# Patient Record
Sex: FEMALE | Race: WHITE | Employment: FULL TIME | URBAN - METROPOLITAN AREA
[De-identification: names, ages, dates, MRNs, and addresses within clinical notes are randomized per-mention and may not be internally consistent; named-entity substitution may affect disease eponyms.]

---

## 2017-02-02 NOTE — PROGRESS NOTES
HPI:   Humble Ocampo is a 46year old female who c/o needing refill on wellbutrin. Would like to increase her dose. Had headache the first two days of taking but no other adverse effects.  Was previously on wellbutrin SR twice daily but prefers the onc total) by mouth daily. Dispense: 30 tablet; Refill: 2    We discussed the nature of depression/anxiety. Crystal Coleman appears to be safe to Her self and others. I recommended treatment with bupropion and Crystal Coleman is in agreement.   I informed Her of the risks, benef

## 2017-04-05 ENCOUNTER — HOSPITAL ENCOUNTER (OUTPATIENT)
Dept: MAMMOGRAPHY | Age: 42
Discharge: HOME | End: 2017-04-05
Attending: PHYSICIAN ASSISTANT
Payer: COMMERCIAL

## 2017-04-05 DIAGNOSIS — Z12.31 VISIT FOR SCREENING MAMMOGRAM: ICD-10-CM

## 2017-04-06 ENCOUNTER — PATIENT MESSAGE (OUTPATIENT)
Dept: FAMILY MEDICINE CLINIC | Facility: CLINIC | Age: 42
End: 2017-04-06

## 2017-04-06 ENCOUNTER — LAB ENCOUNTER (OUTPATIENT)
Dept: LAB | Facility: HOSPITAL | Age: 42
End: 2017-04-06
Attending: PHYSICIAN ASSISTANT
Payer: COMMERCIAL

## 2017-04-06 DIAGNOSIS — Z00.00 LABORATORY EXAM ORDERED AS PART OF ROUTINE GENERAL MEDICAL EXAMINATION: ICD-10-CM

## 2017-04-06 DIAGNOSIS — F32.9 REACTIVE DEPRESSION: ICD-10-CM

## 2017-04-06 DIAGNOSIS — N64.3 GALACTORRHEA: Primary | ICD-10-CM

## 2017-04-06 DIAGNOSIS — N64.3 INAPPROPRIATE PRODUCTION OF MILK: Primary | ICD-10-CM

## 2017-04-06 DIAGNOSIS — N64.3 GALACTORRHEA: ICD-10-CM

## 2017-04-06 PROCEDURE — 36415 COLL VENOUS BLD VENIPUNCTURE: CPT

## 2017-04-06 PROCEDURE — 80061 LIPID PANEL: CPT

## 2017-04-06 PROCEDURE — 82607 VITAMIN B-12: CPT

## 2017-04-06 PROCEDURE — 82306 VITAMIN D 25 HYDROXY: CPT

## 2017-04-06 PROCEDURE — 84146 ASSAY OF PROLACTIN: CPT

## 2017-04-06 PROCEDURE — 80053 COMPREHEN METABOLIC PANEL: CPT

## 2017-04-06 PROCEDURE — 85025 COMPLETE CBC W/AUTO DIFF WBC: CPT

## 2017-04-06 PROCEDURE — 84702 CHORIONIC GONADOTROPIN TEST: CPT

## 2017-04-06 PROCEDURE — 84443 ASSAY THYROID STIM HORMONE: CPT

## 2017-04-06 NOTE — TELEPHONE ENCOUNTER
[employer] please see message from pt below. Do you want her to follow up with appt?

## 2017-04-06 NOTE — TELEPHONE ENCOUNTER
From: Cristhian Mcbride  To: TAHIR Pope  Sent: 4/6/2017 8:27 AM CDT  Subject: Other    Good morning,    I went in for my mammogram yesterday (ordered routinely because I am turning 42), and I was leaking milk.  They canceled the mammogram and aske

## 2017-04-06 NOTE — TELEPHONE ENCOUNTER
From: TAHIR Albarado  To: Deysi óLpez  Sent: 0/6/9803 10:07 AM CDT  Subject: Follow up    Irlanda Dean,     I would also suggest making an appointment with endocrinology for further evaluation. I recommend Dr Larry Tim or colleague at [phone].      An

## 2017-04-07 ENCOUNTER — TELEPHONE (OUTPATIENT)
Dept: FAMILY MEDICINE CLINIC | Facility: CLINIC | Age: 42
End: 2017-04-07

## 2017-04-10 ENCOUNTER — PATIENT MESSAGE (OUTPATIENT)
Dept: FAMILY MEDICINE CLINIC | Facility: CLINIC | Age: 42
End: 2017-04-10

## 2017-04-10 DIAGNOSIS — Z12.39 SCREENING FOR BREAST CANCER: ICD-10-CM

## 2017-04-10 DIAGNOSIS — N64.3 GALACTORRHEA: Primary | ICD-10-CM

## 2017-04-10 DIAGNOSIS — F32.9 REACTIVE DEPRESSION: ICD-10-CM

## 2017-04-11 DIAGNOSIS — D72.819 LEUKOPENIA, UNSPECIFIED TYPE: ICD-10-CM

## 2017-04-11 DIAGNOSIS — N64.3 GALACTORRHEA IN FEMALE: Primary | ICD-10-CM

## 2017-04-11 RX ORDER — BUPROPION HYDROCHLORIDE 300 MG/1
300 TABLET ORAL DAILY
Qty: 30 TABLET | Refills: 2 | OUTPATIENT
Start: 2017-04-11

## 2017-04-11 NOTE — TELEPHONE ENCOUNTER
From: Yovany Milton  To: TAHIR Tee  Sent: 4/10/2017 1:56 PM CDT  Subject: Medication Renewal Request    Original authorizing provider: TAHIR Ricks would like a refill of the following medications:  BuPROPion HCl

## 2017-04-12 NOTE — PROGRESS NOTES
HPI:   Angela Ar is a 46year old female who c/o follow up anxiety. Is tolerating wellbutrin well without side effects. Current stressors include:     Mother on hospice, going through divorce, death of two close friends in the past year     Sym with concentration  PSYCH: denies episodes of panic,denies anhedonia, +trouble sleeping    EXAM:   GENERAL: well developed and in no apparent distress  SKIN: warm & dry  HEENT: atraumatic, normocephalic  NECK: supple, no adenopathy, no thyromegaly  LUNGS:

## 2018-02-01 ENCOUNTER — OFFICE VISIT (OUTPATIENT)
Dept: FAMILY MEDICINE CLINIC | Facility: CLINIC | Age: 43
End: 2018-02-01

## 2018-02-01 VITALS
BODY MASS INDEX: 20.61 KG/M2 | OXYGEN SATURATION: 98 % | WEIGHT: 136 LBS | HEIGHT: 68 IN | HEART RATE: 70 BPM | SYSTOLIC BLOOD PRESSURE: 124 MMHG | DIASTOLIC BLOOD PRESSURE: 78 MMHG | TEMPERATURE: 98 F | RESPIRATION RATE: 20 BRPM

## 2018-02-01 DIAGNOSIS — Z00.00 GENERAL MEDICAL EXAM: ICD-10-CM

## 2018-02-01 DIAGNOSIS — G47.9 SLEEP DISTURBANCE: Primary | ICD-10-CM

## 2018-02-01 DIAGNOSIS — R25.9 ABNORMAL MOVEMENTS: ICD-10-CM

## 2018-02-01 DIAGNOSIS — Z12.31 ENCOUNTER FOR SCREENING MAMMOGRAM FOR HIGH-RISK PATIENT: ICD-10-CM

## 2018-02-01 PROCEDURE — 99214 OFFICE O/P EST MOD 30 MIN: CPT | Performed by: FAMILY MEDICINE

## 2018-02-01 RX ORDER — PRAZOSIN HYDROCHLORIDE 1 MG/1
1 CAPSULE ORAL NIGHTLY
Qty: 90 CAPSULE | Refills: 0 | Status: SHIPPED | OUTPATIENT
Start: 2018-02-01 | End: 2018-03-06

## 2018-02-01 NOTE — PROGRESS NOTES
HPI:   Don Smith is a 38year old female with sleep disturbance    Pt reports she has had cyclic sleep issues  Pt reports it seems to go in cycles of 5 years    Pt remembers most of the time   \" funny electrical feeling in her head, then she will vaginal discharge or itching   MUSCULOSKELETAL: denies back pain  PSYCHE: denies depression or anxiety  HEMATOLOGIC: denies hx of anemia  ENDOCRINE: + thyroid history  ALL/ASTHMA: denies  asthma    EXAM:   /78   Pulse 70   Temp 98.2 °F (36.8 °C) (Ora

## 2018-02-03 ENCOUNTER — HOSPITAL ENCOUNTER (OUTPATIENT)
Dept: MAMMOGRAPHY | Age: 43
Discharge: HOME | End: 2018-02-03
Attending: FAMILY MEDICINE
Payer: COMMERCIAL

## 2018-02-03 DIAGNOSIS — Z12.31 ENCOUNTER FOR SCREENING MAMMOGRAM FOR HIGH-RISK PATIENT: ICD-10-CM

## 2018-02-03 PROCEDURE — 77063 BREAST TOMOSYNTHESIS BI: CPT | Performed by: FAMILY MEDICINE

## 2018-02-03 PROCEDURE — 77067 SCR MAMMO BI INCL CAD: CPT | Performed by: FAMILY MEDICINE

## 2018-03-06 DIAGNOSIS — G47.9 SLEEP DISTURBANCE: ICD-10-CM

## 2018-03-07 ENCOUNTER — PATIENT MESSAGE (OUTPATIENT)
Dept: FAMILY MEDICINE CLINIC | Facility: CLINIC | Age: 43
End: 2018-03-07

## 2018-03-07 DIAGNOSIS — G47.9 SLEEP DISTURBANCE: ICD-10-CM

## 2018-03-07 RX ORDER — PRAZOSIN HYDROCHLORIDE 1 MG/1
1 CAPSULE ORAL NIGHTLY
Qty: 90 CAPSULE | Refills: 0 | Status: SHIPPED | OUTPATIENT
Start: 2018-03-07 | End: 2018-05-14

## 2018-03-07 RX ORDER — PRAZOSIN HYDROCHLORIDE 2 MG/1
2 CAPSULE ORAL NIGHTLY
Qty: 30 CAPSULE | Refills: 0 | Status: SHIPPED | OUTPATIENT
Start: 2018-03-07 | End: 2018-05-14

## 2018-03-07 RX ORDER — PRAZOSIN HYDROCHLORIDE 1 MG/1
1 CAPSULE ORAL NIGHTLY
Qty: 30 CAPSULE | Refills: 0 | Status: SHIPPED | OUTPATIENT
Start: 2018-03-07 | End: 2018-05-14

## 2018-03-07 NOTE — TELEPHONE ENCOUNTER
Pt was supposed to follow up after one month to discuss medication as it is new  Pt was supposed to be seen after one month on meds   Ok for 30 days of 3mg

## 2018-03-07 NOTE — TELEPHONE ENCOUNTER
From: Yovany Milton  Sent: 7/6/9765 12:58 PM CST  Subject: Medication Renewal Request    Chiqui Ramírez would like a refill of the following medications:     Prazosin HCl 1 MG Oral Cap Dangelo Teran, DO]   Patient Comment: I have had some success w

## 2018-03-07 NOTE — TELEPHONE ENCOUNTER
From: Humble Ocampo  To: Lyric Whitley DO  Sent: 3/7/2018 10:09 AM CST  Subject: Prescription Question    I see a refill was approved, but in my request I asked for the dosage to be changed.  I run out after 10-14 days and my insurance won't cover Murdock Waldron

## 2018-03-23 ENCOUNTER — HOSPITAL ENCOUNTER (OUTPATIENT)
Dept: ULTRASOUND IMAGING | Age: 43
Discharge: HOME | End: 2018-03-23
Attending: OTOLARYNGOLOGY
Payer: COMMERCIAL

## 2018-03-23 ENCOUNTER — LAB ENCOUNTER (OUTPATIENT)
Dept: LAB | Age: 43
End: 2018-03-23
Attending: FAMILY MEDICINE
Payer: COMMERCIAL

## 2018-03-23 ENCOUNTER — PRIOR ORIGINAL RECORDS (OUTPATIENT)
Dept: OTHER | Age: 43
End: 2018-03-23

## 2018-03-23 DIAGNOSIS — Z00.00 GENERAL MEDICAL EXAM: ICD-10-CM

## 2018-03-23 DIAGNOSIS — E04.2 NONTOXIC MULTINODULAR GOITER: ICD-10-CM

## 2018-03-23 LAB
25-HYDROXYVITAMIN D (TOTAL): 36.1 NG/ML (ref 30–100)
ALBUMIN SERPL-MCNC: 3.7 G/DL (ref 3.5–4.8)
ALP LIVER SERPL-CCNC: 46 U/L (ref 37–98)
ALT SERPL-CCNC: 17 U/L (ref 14–54)
AST SERPL-CCNC: 13 U/L (ref 15–41)
BASOPHILS # BLD AUTO: 0.04 X10(3) UL (ref 0–0.1)
BASOPHILS NFR BLD AUTO: 0.7 %
BILIRUB SERPL-MCNC: 0.7 MG/DL (ref 0.1–2)
BUN BLD-MCNC: 9 MG/DL (ref 8–20)
CALCIUM BLD-MCNC: 8.3 MG/DL (ref 8.3–10.3)
CHLORIDE: 108 MMOL/L (ref 101–111)
CHOLEST SMN-MCNC: 160 MG/DL (ref ?–200)
CO2: 26 MMOL/L (ref 22–32)
CREAT BLD-MCNC: 0.76 MG/DL (ref 0.55–1.02)
EOSINOPHIL # BLD AUTO: 0.25 X10(3) UL (ref 0–0.3)
EOSINOPHIL NFR BLD AUTO: 4.7 %
ERYTHROCYTE [DISTWIDTH] IN BLOOD BY AUTOMATED COUNT: 12.5 % (ref 11.5–16)
FREE T4: 1 NG/DL (ref 0.9–1.8)
GLUCOSE BLD-MCNC: 87 MG/DL (ref 70–99)
HAV AB SERPL IA-ACNC: 624 PG/ML (ref 193–986)
HCT VFR BLD AUTO: 38.9 % (ref 34–50)
HDLC SERPL-MCNC: 60 MG/DL (ref 45–?)
HDLC SERPL: 2.67 {RATIO} (ref ?–4.44)
HGB BLD-MCNC: 12.7 G/DL (ref 12–16)
IMMATURE GRANULOCYTE COUNT: 0.02 X10(3) UL (ref 0–1)
IMMATURE GRANULOCYTE RATIO %: 0.4 %
LDLC SERPL CALC-MCNC: 86 MG/DL (ref ?–130)
LYMPHOCYTES # BLD AUTO: 1.81 X10(3) UL (ref 0.9–4)
LYMPHOCYTES NFR BLD AUTO: 33.9 %
M PROTEIN MFR SERPL ELPH: 6.7 G/DL (ref 6.1–8.3)
MCH RBC QN AUTO: 29.2 PG (ref 27–33.2)
MCHC RBC AUTO-ENTMCNC: 32.6 G/DL (ref 31–37)
MCV RBC AUTO: 89.4 FL (ref 81–100)
MONOCYTES # BLD AUTO: 0.49 X10(3) UL (ref 0.1–1)
MONOCYTES NFR BLD AUTO: 9.2 %
NEUTROPHIL ABS PRELIM: 2.73 X10 (3) UL (ref 1.3–6.7)
NEUTROPHILS # BLD AUTO: 2.73 X10(3) UL (ref 1.3–6.7)
NEUTROPHILS NFR BLD AUTO: 51.1 %
NONHDLC SERPL-MCNC: 100 MG/DL (ref ?–130)
PLATELET # BLD AUTO: 251 10(3)UL (ref 150–450)
POTASSIUM SERPL-SCNC: 4.5 MMOL/L (ref 3.6–5.1)
RBC # BLD AUTO: 4.35 X10(6)UL (ref 3.8–5.1)
RED CELL DISTRIBUTION WIDTH-SD: 41.1 FL (ref 35.1–46.3)
SODIUM SERPL-SCNC: 140 MMOL/L (ref 136–144)
TRIGL SERPL-MCNC: 69 MG/DL (ref ?–150)
TSI SER-ACNC: 1.13 MIU/ML (ref 0.35–5.5)
VLDLC SERPL CALC-MCNC: 14 MG/DL (ref 5–40)
WBC # BLD AUTO: 5.3 X10(3) UL (ref 4–13)

## 2018-03-23 PROCEDURE — 84439 ASSAY OF FREE THYROXINE: CPT

## 2018-03-23 PROCEDURE — 76536 US EXAM OF HEAD AND NECK: CPT | Performed by: OTOLARYNGOLOGY

## 2018-03-23 PROCEDURE — 85025 COMPLETE CBC W/AUTO DIFF WBC: CPT

## 2018-03-23 PROCEDURE — 82607 VITAMIN B-12: CPT

## 2018-03-23 PROCEDURE — 80061 LIPID PANEL: CPT

## 2018-03-23 PROCEDURE — 82306 VITAMIN D 25 HYDROXY: CPT

## 2018-03-23 PROCEDURE — 84443 ASSAY THYROID STIM HORMONE: CPT

## 2018-03-23 PROCEDURE — 80050 GENERAL HEALTH PANEL: CPT

## 2018-03-23 PROCEDURE — 36415 COLL VENOUS BLD VENIPUNCTURE: CPT

## 2018-05-14 ENCOUNTER — OFFICE VISIT (OUTPATIENT)
Dept: FAMILY MEDICINE CLINIC | Facility: CLINIC | Age: 43
End: 2018-05-14

## 2018-05-14 VITALS
HEART RATE: 86 BPM | OXYGEN SATURATION: 98 % | RESPIRATION RATE: 22 BRPM | WEIGHT: 140 LBS | BODY MASS INDEX: 21.22 KG/M2 | SYSTOLIC BLOOD PRESSURE: 120 MMHG | HEIGHT: 68 IN | TEMPERATURE: 99 F | DIASTOLIC BLOOD PRESSURE: 80 MMHG

## 2018-05-14 DIAGNOSIS — E06.3 HASHIMOTO'S THYROIDITIS: ICD-10-CM

## 2018-05-14 DIAGNOSIS — Z00.00 ANNUAL PHYSICAL EXAM: Primary | ICD-10-CM

## 2018-05-14 DIAGNOSIS — F51.5 NIGHTMARES: ICD-10-CM

## 2018-05-14 DIAGNOSIS — E04.2 MULTIPLE THYROID NODULES: ICD-10-CM

## 2018-05-14 DIAGNOSIS — R21 RASH: ICD-10-CM

## 2018-05-14 PROCEDURE — 99396 PREV VISIT EST AGE 40-64: CPT | Performed by: FAMILY MEDICINE

## 2018-05-14 RX ORDER — PRAZOSIN HYDROCHLORIDE 2 MG/1
2 CAPSULE ORAL NIGHTLY
Qty: 30 CAPSULE | Refills: 0 | Status: SHIPPED | OUTPATIENT
Start: 2018-05-14 | End: 2018-09-08

## 2018-05-14 RX ORDER — PRAZOSIN HYDROCHLORIDE 1 MG/1
1 CAPSULE ORAL NIGHTLY
Qty: 30 CAPSULE | Refills: 0 | Status: SHIPPED | OUTPATIENT
Start: 2018-05-14 | End: 2018-09-08

## 2018-05-14 NOTE — PROGRESS NOTES
HPI:   Humble Ocampo is a 41year old female who presents for a complete physical exam.     Wt Readings from Last 6 Encounters:  05/14/18 : 140 lb  02/01/18 : 136 lb  04/12/17 : 135 lb  02/02/17 : 137 lb  12/28/16 : 138 lb  04/04/16 : 135 lb    Body ma Alcohol use: No              Occ: . : . Children: 5. Exercise: three times per week.   Diet: doesn't watch and pt has been low gluten      REVIEW OF SYSTEMS:   GENERAL: feels well otherwise  SKIN: denies any unusual skin lesions  EYES:denies b THYROID ([id number]); Future    3. Multiple thyroid nodules  - US THYROID (GEQ=76214); Future    4. Rash  - Fluocinonide 0.05 % External Cream; Apply bid For 10-14 days  Dispense: 30 g; Refill: 1    5. Nightmares  - Prazosin HCl 2 MG Oral Cap;  Take 1 capsul

## 2018-05-23 ENCOUNTER — APPOINTMENT (OUTPATIENT)
Dept: GENERAL RADIOLOGY | Facility: HOSPITAL | Age: 43
End: 2018-05-23
Attending: EMERGENCY MEDICINE
Payer: COMMERCIAL

## 2018-05-23 ENCOUNTER — PRIOR ORIGINAL RECORDS (OUTPATIENT)
Dept: OTHER | Age: 43
End: 2018-05-23

## 2018-05-23 ENCOUNTER — HOSPITAL ENCOUNTER (OUTPATIENT)
Age: 43
Discharge: ANOTHER HOSPITAL | End: 2018-05-23
Attending: FAMILY MEDICINE
Payer: COMMERCIAL

## 2018-05-23 ENCOUNTER — HOSPITAL ENCOUNTER (EMERGENCY)
Facility: HOSPITAL | Age: 43
Discharge: HOME | End: 2018-05-23
Attending: EMERGENCY MEDICINE
Payer: COMMERCIAL

## 2018-05-23 VITALS
RESPIRATION RATE: 18 BRPM | WEIGHT: 140 LBS | SYSTOLIC BLOOD PRESSURE: 108 MMHG | DIASTOLIC BLOOD PRESSURE: 75 MMHG | OXYGEN SATURATION: 100 % | HEART RATE: 101 BPM | TEMPERATURE: 98 F | BODY MASS INDEX: 21 KG/M2

## 2018-05-23 VITALS
WEIGHT: 140 LBS | BODY MASS INDEX: 21.22 KG/M2 | DIASTOLIC BLOOD PRESSURE: 60 MMHG | HEIGHT: 68 IN | HEART RATE: 75 BPM | OXYGEN SATURATION: 99 % | TEMPERATURE: 98 F | SYSTOLIC BLOOD PRESSURE: 104 MMHG | RESPIRATION RATE: 18 BRPM

## 2018-05-23 DIAGNOSIS — R00.2 PALPITATIONS: Primary | ICD-10-CM

## 2018-05-23 DIAGNOSIS — R94.31 ABNORMAL EKG: Primary | ICD-10-CM

## 2018-05-23 PROCEDURE — 99214 OFFICE O/P EST MOD 30 MIN: CPT

## 2018-05-23 PROCEDURE — 99283 EMERGENCY DEPT VISIT LOW MDM: CPT

## 2018-05-23 PROCEDURE — 80053 COMPREHEN METABOLIC PANEL: CPT | Performed by: EMERGENCY MEDICINE

## 2018-05-23 PROCEDURE — 99284 EMERGENCY DEPT VISIT MOD MDM: CPT

## 2018-05-23 PROCEDURE — 93005 ELECTROCARDIOGRAM TRACING: CPT

## 2018-05-23 PROCEDURE — 84484 ASSAY OF TROPONIN QUANT: CPT

## 2018-05-23 PROCEDURE — 93010 ELECTROCARDIOGRAM REPORT: CPT

## 2018-05-23 PROCEDURE — 80047 BASIC METABLC PNL IONIZED CA: CPT

## 2018-05-23 PROCEDURE — 84484 ASSAY OF TROPONIN QUANT: CPT | Performed by: EMERGENCY MEDICINE

## 2018-05-23 PROCEDURE — 85025 COMPLETE CBC W/AUTO DIFF WBC: CPT | Performed by: EMERGENCY MEDICINE

## 2018-05-23 PROCEDURE — 83735 ASSAY OF MAGNESIUM: CPT | Performed by: EMERGENCY MEDICINE

## 2018-05-23 PROCEDURE — 85025 COMPLETE CBC W/AUTO DIFF WBC: CPT | Performed by: NURSE PRACTITIONER

## 2018-05-23 PROCEDURE — 96360 HYDRATION IV INFUSION INIT: CPT

## 2018-05-23 PROCEDURE — 85378 FIBRIN DEGRADE SEMIQUANT: CPT | Performed by: NURSE PRACTITIONER

## 2018-05-23 PROCEDURE — 36415 COLL VENOUS BLD VENIPUNCTURE: CPT

## 2018-05-23 RX ORDER — ONDANSETRON 4 MG/1
4 TABLET, ORALLY DISINTEGRATING ORAL ONCE
Status: COMPLETED | OUTPATIENT
Start: 2018-05-23 | End: 2018-05-23

## 2018-05-23 NOTE — ED PROVIDER NOTES
Patient Seen in: [facility] Emergency Department    History   Patient presents with:   Eye Visual Problem (opthalmic)  Nausea/Vomiting/Diarrhea (gastrointestinal)  Back Pain (musculoskeletal)    Stated Complaint: nausea, dizziness, blurred vision and equal react to light extraocular muscles intact no scleral icterus, mucous membranes are moist, there is no erythema or exudate in the posterior pharynx  Neck: Supple no JVD no lymphadenopathy no meningismus no carotid bruit  CV: Regular rate and rhythm no 2033  ------------------------------------------------------------    Patient had a negative workup. I told her I would like her to follow-up with cardiology and I am comfortable letting her go home. Will also send some thyroid panel.   Does definitely ne

## 2018-05-23 NOTE — ED NOTES
Feeling dizzy. Cardiac monitor sinus tachycardia . PA at cart side. Repeat EKG done. Vital signs obtained.

## 2018-05-23 NOTE — ED NOTES
Patient appears angry because she said that she is  not sure why she is here  Dizziness is worse with movement

## 2018-05-23 NOTE — ED INITIAL ASSESSMENT (HPI)
Awoke this morning with palpitations, dizziness and right upper back pain since 5 am. Complains of nausea intermittently , no vomiting. Denies shortness of breath. States feels \" like a panic attack\". Denies chest pain or shortness of breath.  States that

## 2018-05-23 NOTE — ED INITIAL ASSESSMENT (HPI)
Pt sts that she felt dizzy this am when she woke up and has been dizzy with nausea since. Pt sts that when she gets dizzy, she feels like she is going to pass out.  Pt also c/o sharp upper R shoulder pain that \"makes my bones feel cold\" and it feels like

## 2018-05-23 NOTE — ED NOTES
Patient did relate that her sister had a TIA due to a heart valve problem called \" PFO'. PA and physician notified of this family history.

## 2018-05-23 NOTE — ED PROVIDER NOTES
Patient Seen in: [facility] [place] [address] [place] [address]    History   Patient presents with:  Dizziness (neurologic)  Arrythmia/Palpitations (cardiovascular)  Back Pain (musculoskeletal)    Stated Complaint: 7 hours chest pains/dizziness    41year-old fema Review of Systems   Constitutional: Negative for chills and fever. HENT: Negative. Respiratory: Positive for chest tightness. Cardiovascular: Positive for palpitations. Negative for leg swelling. Gastrointestinal: Positive for nausea.    Gen distress. She has no wheezes. She has no rales. She exhibits no tenderness. Abdominal: Soft. Bowel sounds are normal.   Musculoskeletal: Normal range of motion. Neurological: She is alert and oriented to person, place, and time.  She has normal strength Clinical Impression:  Abnormal EKG  (primary encounter diagnosis)    Disposition:   Ic to ed  5/23/2018  1:21 pm    Follow-up:  [facility]  [phone]  Today          Medications Prescribed:  Cu

## 2018-06-06 ENCOUNTER — PATIENT MESSAGE (OUTPATIENT)
Dept: FAMILY MEDICINE CLINIC | Facility: CLINIC | Age: 43
End: 2018-06-06

## 2018-06-06 NOTE — TELEPHONE ENCOUNTER
From: Dora Joshua  To: Carlie Campa DO  Sent: 6/6/2018 11:55 AM CDT  Subject: Non-Urgent Medical Question    1. I had a recent trip to ER with severe heart palpitations/dizziness.  During EKG they saw some sort of arrhythmia and tachycardia, which I

## 2018-06-11 PROBLEM — F32.A ANXIETY AND DEPRESSION: Status: ACTIVE | Noted: 2018-06-11

## 2018-06-11 PROBLEM — F41.9 ANXIETY AND DEPRESSION: Status: ACTIVE | Noted: 2018-06-11

## 2018-06-11 NOTE — PROGRESS NOTES
HPI:   Alexander Pugh is a 41year old female who c/o wanting to restart bupropion. Has been on this medication in the past. States she has stopped in the past when she was feeling well. She has been off for almost 1 year.    She has been on zoloft, p good eye contact; good hygiene. ASSESSMENT AND PLAN:   1. Anxiety and depression  Restart bupropion. Side effects discussed. Discussed it may make her anxiety worse; pt states she is aware and this has not happened in the past  Recommended counseling.

## 2018-06-28 ENCOUNTER — PATIENT MESSAGE (OUTPATIENT)
Dept: FAMILY MEDICINE CLINIC | Facility: CLINIC | Age: 43
End: 2018-06-28

## 2018-06-29 RX ORDER — BUPROPION HYDROCHLORIDE 300 MG/1
300 TABLET ORAL DAILY
Qty: 30 TABLET | Refills: 0 | Status: SHIPPED | OUTPATIENT
Start: 2018-06-29 | End: 2018-09-08

## 2018-06-29 NOTE — TELEPHONE ENCOUNTER
----- Message from [address] [place] [address]. Daryn Carrillo sent at 5/34/8302  8:06 PM CDT -----  Regarding: Prescription Question  Contact: [phone]  Can you please send in a prescription for Bupropion er 300mg?  At my appointment you asked my to let you know when it is madeleine

## 2018-06-29 NOTE — TELEPHONE ENCOUNTER
See attached email. Last OV AV 6-11-18 with request for one month follow up, not yet scheduled. Approve/Deny set up RX for 300mg ER (XL) one daily.

## 2018-07-16 ENCOUNTER — MYAURORA ACCOUNT LINK (OUTPATIENT)
Dept: OTHER | Age: 43
End: 2018-07-16

## 2018-07-16 ENCOUNTER — PRIOR ORIGINAL RECORDS (OUTPATIENT)
Dept: OTHER | Age: 43
End: 2018-07-16

## 2018-07-18 LAB
ALBUMIN: 3.6 G/DL
ALKALINE PHOSPHATATE(ALK PHOS): 41 IU/L
BILIRUBIN TOTAL: 0.6 MG/DL
BUN: 9 MG/DL
CALCIUM: 8.3 MG/DL
CHLORIDE: 108 MEQ/L
CHOLESTEROL, TOTAL: 160 MG/DL
CREATININE, SERUM: 0.54 MG/DL
FREE T4: 1 MG/DL
GLUCOSE: 101 MG/DL
HDL CHOLESTEROL: 60 MG/DL
HEMATOCRIT: 35.3 %
HEMOGLOBIN: 12.2 G/DL
LDL CHOLESTEROL: 86 MG/DL
MAGNESIUM: 2.2 MG/DL
PLATELETS: 279 K/UL
POTASSIUM, SERUM: 3.9 MEQ/L
PROTEIN, TOTAL: 6.4 G/DL
RED BLOOD COUNT: 4.08 X 10-6/U
SGOT (AST): 13 IU/L
SGPT (ALT): 20 IU/L
SODIUM: 140 MEQ/L
THYROID STIMULATING HORMONE: 1.13 MLU/L
TRIGLYCERIDES: 69 MG/DL
VITAMIN D 25-OH: 36.1 NG/ML
WHITE BLOOD COUNT: 6.8 X 10-3/U

## 2018-08-03 ENCOUNTER — TELEPHONE (OUTPATIENT)
Dept: FAMILY MEDICINE CLINIC | Facility: CLINIC | Age: 43
End: 2018-08-03

## 2018-08-07 ENCOUNTER — PRIOR ORIGINAL RECORDS (OUTPATIENT)
Dept: OTHER | Age: 43
End: 2018-08-07

## 2018-09-08 ENCOUNTER — APPOINTMENT (OUTPATIENT)
Dept: LAB | Age: 43
End: 2018-09-08
Attending: PHYSICIAN ASSISTANT
Payer: COMMERCIAL

## 2018-09-08 ENCOUNTER — OFFICE VISIT (OUTPATIENT)
Dept: FAMILY MEDICINE CLINIC | Facility: CLINIC | Age: 43
End: 2018-09-08
Payer: COMMERCIAL

## 2018-09-08 VITALS
DIASTOLIC BLOOD PRESSURE: 78 MMHG | TEMPERATURE: 98 F | HEIGHT: 68 IN | BODY MASS INDEX: 21.22 KG/M2 | OXYGEN SATURATION: 100 % | HEART RATE: 83 BPM | SYSTOLIC BLOOD PRESSURE: 110 MMHG | RESPIRATION RATE: 20 BRPM | WEIGHT: 140 LBS

## 2018-09-08 DIAGNOSIS — M62.81 MUSCLE WEAKNESS: ICD-10-CM

## 2018-09-08 DIAGNOSIS — R25.3 MUSCLE FASCICULATION: Primary | ICD-10-CM

## 2018-09-08 DIAGNOSIS — R25.3 MUSCLE FASCICULATION: ICD-10-CM

## 2018-09-08 LAB
ALBUMIN SERPL-MCNC: 4.1 G/DL (ref 3.5–4.8)
ALBUMIN/GLOB SERPL: 1.2 {RATIO} (ref 1–2)
ALP LIVER SERPL-CCNC: 47 U/L (ref 37–98)
ALT SERPL-CCNC: 19 U/L (ref 14–54)
ANION GAP SERPL CALC-SCNC: 9 MMOL/L (ref 0–18)
AST SERPL-CCNC: 15 U/L (ref 15–41)
BASOPHILS # BLD AUTO: 0.01 X10(3) UL (ref 0–0.1)
BASOPHILS NFR BLD AUTO: 0.1 %
BILIRUB SERPL-MCNC: 1.8 MG/DL (ref 0.1–2)
BUN BLD-MCNC: 11 MG/DL (ref 8–20)
BUN/CREAT SERPL: 15.5 (ref 10–20)
CALCIUM BLD-MCNC: 9 MG/DL (ref 8.3–10.3)
CHLORIDE SERPL-SCNC: 106 MMOL/L (ref 101–111)
CO2 SERPL-SCNC: 23 MMOL/L (ref 22–32)
CREAT BLD-MCNC: 0.71 MG/DL (ref 0.55–1.02)
DEPRECATED HBV CORE AB SER IA-ACNC: 22.3 NG/ML (ref 12–240)
EOSINOPHIL # BLD AUTO: 0.06 X10(3) UL (ref 0–0.3)
EOSINOPHIL NFR BLD AUTO: 0.8 %
ERYTHROCYTE [DISTWIDTH] IN BLOOD BY AUTOMATED COUNT: 13.5 % (ref 11.5–16)
GLOBULIN PLAS-MCNC: 3.4 G/DL (ref 2.5–4)
GLUCOSE BLD-MCNC: 89 MG/DL (ref 70–99)
HAV IGM SER QL: 2.3 MG/DL (ref 1.8–2.5)
HCT VFR BLD AUTO: 43.2 % (ref 34–50)
HGB BLD-MCNC: 14.2 G/DL (ref 12–16)
IMMATURE GRANULOCYTE COUNT: 0.02 X10(3) UL (ref 0–1)
IMMATURE GRANULOCYTE RATIO %: 0.3 %
IRON SATURATION: 37 % (ref 15–50)
IRON: 160 UG/DL (ref 28–170)
LYMPHOCYTES # BLD AUTO: 1.34 X10(3) UL (ref 0.9–4)
LYMPHOCYTES NFR BLD AUTO: 18.4 %
M PROTEIN MFR SERPL ELPH: 7.5 G/DL (ref 6.1–8.3)
MCH RBC QN AUTO: 28.6 PG (ref 27–33.2)
MCHC RBC AUTO-ENTMCNC: 32.9 G/DL (ref 31–37)
MCV RBC AUTO: 86.9 FL (ref 81–100)
MONOCYTES # BLD AUTO: 0.56 X10(3) UL (ref 0.1–1)
MONOCYTES NFR BLD AUTO: 7.7 %
NEUTROPHIL ABS PRELIM: 5.31 X10 (3) UL (ref 1.3–6.7)
NEUTROPHILS # BLD AUTO: 5.31 X10(3) UL (ref 1.3–6.7)
NEUTROPHILS NFR BLD AUTO: 72.7 %
OSMOLALITY SERPL CALC.SUM OF ELEC: 285 MOSM/KG (ref 275–295)
PLATELET # BLD AUTO: 278 10(3)UL (ref 150–450)
POTASSIUM SERPL-SCNC: 4.2 MMOL/L (ref 3.6–5.1)
RBC # BLD AUTO: 4.97 X10(6)UL (ref 3.8–5.1)
RED CELL DISTRIBUTION WIDTH-SD: 42.6 FL (ref 35.1–46.3)
SODIUM SERPL-SCNC: 138 MMOL/L (ref 136–144)
T4 FREE SERPL-MCNC: 1.1 NG/DL (ref 0.9–1.8)
TOTAL IRON BINDING CAPACITY: 432 UG/DL (ref 240–450)
TRANSFERRIN SERPL-MCNC: 290 MG/DL (ref 200–360)
TSI SER-ACNC: 1.07 MIU/ML (ref 0.35–5.5)
WBC # BLD AUTO: 7.3 X10(3) UL (ref 4–13)

## 2018-09-08 PROCEDURE — 84443 ASSAY THYROID STIM HORMONE: CPT

## 2018-09-08 PROCEDURE — 83735 ASSAY OF MAGNESIUM: CPT

## 2018-09-08 PROCEDURE — 82728 ASSAY OF FERRITIN: CPT | Performed by: PHYSICIAN ASSISTANT

## 2018-09-08 PROCEDURE — 84238 ASSAY NONENDOCRINE RECEPTOR: CPT

## 2018-09-08 PROCEDURE — 83519 RIA NONANTIBODY: CPT

## 2018-09-08 PROCEDURE — 80053 COMPREHEN METABOLIC PANEL: CPT | Performed by: PHYSICIAN ASSISTANT

## 2018-09-08 PROCEDURE — 99214 OFFICE O/P EST MOD 30 MIN: CPT | Performed by: PHYSICIAN ASSISTANT

## 2018-09-08 PROCEDURE — 36415 COLL VENOUS BLD VENIPUNCTURE: CPT

## 2018-09-08 PROCEDURE — 83540 ASSAY OF IRON: CPT | Performed by: PHYSICIAN ASSISTANT

## 2018-09-08 PROCEDURE — 84439 ASSAY OF FREE THYROXINE: CPT

## 2018-09-08 PROCEDURE — 85025 COMPLETE CBC W/AUTO DIFF WBC: CPT | Performed by: PHYSICIAN ASSISTANT

## 2018-09-08 PROCEDURE — 86038 ANTINUCLEAR ANTIBODIES: CPT | Performed by: PHYSICIAN ASSISTANT

## 2018-09-08 PROCEDURE — 83550 IRON BINDING TEST: CPT | Performed by: PHYSICIAN ASSISTANT

## 2018-09-08 NOTE — PROGRESS NOTES
HPI:   Brenna Rees is a 41year old female with hx of Hashimotos, thyroid nodules, vit D deficiency and anxiety and depression who presents for muscle twitching, mostly in her arms and legs for the past 2 months. Also reports weakness of her arms.  Al Maternal Aunt 36      Social History:   Social History    Tobacco Use      Smoking status: Never Smoker      Smokeless tobacco: Never Used    Alcohol use: No      Alcohol/week: 0.0 oz    Drug use: No       REVIEW OF SYSTEMS:   GENERAL: feels tired all the appointment. - CBC WITH DIFFERENTIAL WITH PLATELET  - COMP METABOLIC PANEL (14)  - JESSIKA, DIRECT, REFLEX TO 9 ENAS  - TSH+FREE T4; Future  - MAGNESIUM; Future  - IRON AND TIBC  - FERRITIN  - MRI BRAIN (CPT=67681);  Future  - NEURO - INTERNAL  - ACETYLCHOLIN

## 2018-09-11 LAB
ACETYLCHOLINE BINDING AB: 0 NMOL/L
ACETYLCHOLINE BLOCKING AB: 4 %
ANA SCREEN: NEGATIVE
MUSK ANTIBODY: 0 NMOL/L

## 2018-09-13 ENCOUNTER — HOSPITAL ENCOUNTER (OUTPATIENT)
Dept: MRI IMAGING | Facility: HOSPITAL | Age: 43
Discharge: HOME | End: 2018-09-13
Attending: PHYSICIAN ASSISTANT
Payer: COMMERCIAL

## 2018-09-13 DIAGNOSIS — M62.81 MUSCLE WEAKNESS: ICD-10-CM

## 2018-09-13 DIAGNOSIS — R25.3 MUSCLE FASCICULATION: ICD-10-CM

## 2018-09-13 PROCEDURE — 70551 MRI BRAIN STEM W/O DYE: CPT | Performed by: PHYSICIAN ASSISTANT

## 2018-09-14 ENCOUNTER — APPOINTMENT (OUTPATIENT)
Dept: LAB | Age: 43
End: 2018-09-14
Attending: PHYSICIAN ASSISTANT
Payer: COMMERCIAL

## 2018-09-14 ENCOUNTER — PATIENT MESSAGE (OUTPATIENT)
Dept: FAMILY MEDICINE CLINIC | Facility: CLINIC | Age: 43
End: 2018-09-14

## 2018-09-14 PROCEDURE — 36415 COLL VENOUS BLD VENIPUNCTURE: CPT | Performed by: PHYSICIAN ASSISTANT

## 2018-09-14 PROCEDURE — 86618 LYME DISEASE ANTIBODY: CPT | Performed by: PHYSICIAN ASSISTANT

## 2018-09-14 NOTE — TELEPHONE ENCOUNTER
From: Cherylene Estimable  To: TAHIR Barraza  Sent: 9/14/2018 8:50 AM CDT  Subject: Test Results Question    Sorry if this message is a duplicate, I tried to send earlier but I don't see that it went through.   Thank you for sending over the MRI result

## 2018-09-25 ENCOUNTER — APPOINTMENT (OUTPATIENT)
Dept: GENERAL RADIOLOGY | Facility: HOSPITAL | Age: 43
End: 2018-09-25
Attending: EMERGENCY MEDICINE
Payer: COMMERCIAL

## 2018-09-25 ENCOUNTER — OFFICE VISIT (OUTPATIENT)
Dept: NEUROLOGY | Facility: CLINIC | Age: 43
End: 2018-09-25
Payer: COMMERCIAL

## 2018-09-25 ENCOUNTER — APPOINTMENT (OUTPATIENT)
Dept: LAB | Age: 43
End: 2018-09-25
Attending: Other
Payer: COMMERCIAL

## 2018-09-25 ENCOUNTER — HOSPITAL ENCOUNTER (EMERGENCY)
Facility: HOSPITAL | Age: 43
Discharge: HOME | End: 2018-09-25
Attending: EMERGENCY MEDICINE
Payer: COMMERCIAL

## 2018-09-25 VITALS
BODY MASS INDEX: 21.22 KG/M2 | DIASTOLIC BLOOD PRESSURE: 64 MMHG | TEMPERATURE: 98 F | RESPIRATION RATE: 16 BRPM | HEIGHT: 68 IN | HEART RATE: 69 BPM | SYSTOLIC BLOOD PRESSURE: 100 MMHG | OXYGEN SATURATION: 98 % | WEIGHT: 140 LBS

## 2018-09-25 VITALS
RESPIRATION RATE: 14 BRPM | HEART RATE: 78 BPM | WEIGHT: 144 LBS | DIASTOLIC BLOOD PRESSURE: 64 MMHG | SYSTOLIC BLOOD PRESSURE: 108 MMHG | BODY MASS INDEX: 22 KG/M2

## 2018-09-25 DIAGNOSIS — S61.451A DOG BITE OF RIGHT HAND, INITIAL ENCOUNTER: Primary | ICD-10-CM

## 2018-09-25 DIAGNOSIS — R25.3 MUSCLE TWITCHING: Primary | ICD-10-CM
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
## 2018-09-25 DIAGNOSIS — R25.3 MUSCLE TWITCHING: ICD-10-CM

## 2018-09-25 PROCEDURE — 36415 COLL VENOUS BLD VENIPUNCTURE: CPT

## 2018-09-25 PROCEDURE — 99204 OFFICE O/P NEW MOD 45 MIN: CPT | Performed by: OTHER

## 2018-09-25 PROCEDURE — 99284 EMERGENCY DEPT VISIT MOD MDM: CPT

## 2018-09-25 PROCEDURE — 99283 EMERGENCY DEPT VISIT LOW MDM: CPT

## 2018-09-25 PROCEDURE — 73130 X-RAY EXAM OF HAND: CPT | Performed by: EMERGENCY MEDICINE

## 2018-09-25 PROCEDURE — 82550 ASSAY OF CK (CPK): CPT

## 2018-09-25 PROCEDURE — 82085 ASSAY OF ALDOLASE: CPT

## 2018-09-25 RX ORDER — CLINDAMYCIN HYDROCHLORIDE 300 MG/1
300 CAPSULE ORAL 3 TIMES DAILY
Qty: 21 CAPSULE | Refills: 0 | Status: SHIPPED | OUTPATIENT
Start: 2018-09-25 | End: 2018-10-02

## 2018-09-25 RX ORDER — SULFAMETHOXAZOLE AND TRIMETHOPRIM 800; 160 MG/1; MG/1
1 TABLET ORAL 2 TIMES DAILY
Qty: 14 TABLET | Refills: 0 | Status: SHIPPED | OUTPATIENT
Start: 2018-09-25 | End: 2018-10-02

## 2018-09-25 RX ORDER — IBUPROFEN 600 MG/1
600 TABLET ORAL ONCE
Status: DISCONTINUED | OUTPATIENT
Start: 2018-09-25 | End: 2018-09-25

## 2018-09-25 NOTE — PATIENT INSTRUCTIONS
Refill policies:    • Allow 2-3 business days for refills; controlled substances may take longer.   • Contact your pharmacy at least 5 days prior to running out of medication and have them send an electronic request or submit request through the “request re entire amount billed. Precertification and Prior Authorizations: If your physician has recommended that you have a procedure or additional testing performed.   [facility]) will contact your insurance carrier to obtain pre-certi

## 2018-09-25 NOTE — H&P
Neurology H&P    Lossie Revering Patient Status:  No patient class for patient encounter    1975 MRN [id number]   Location ED [facility], [address], [address] Attending No att. providers found   Hosp Day # 0 PCP Dipak Baez DO things\" everywhere and feels that they are crawling down her throat. She reports that she wakes crying and feeling heart palpitations and is very fearful.  She states that she fell down the stairs once (no LOC ot hitting her head) and states that this may masses, rashes, lumps or bruising    Objective/Physical Exam:    Vital Signs: There were no vitals taken for this visit.     Gen: Awake and in no apparent distress  HEENT: moist mucus membranes  Neck: Supple  Cardiovascular: Regular rate and rhythm, no mur 9/14/2018 9/8/2018   WBC      4.0 - 13.0 x10(3) uL  7.3   RBC      3.80 - 5.10 x10(6)uL  4.97   Hemoglobin      12.0 - 16.0 g/dL  14.2   Hematocrit      34.0 - 50.0 %  43.2   Platelet Count      339.5 - 450.0 10(3)uL  278.0   MCV      81.0 - 100.0 fL  86. 9 37   T4,Free (Direct)      0.9 - 1.8 ng/dL  1.1   TSH      0.350 - 5.500 mIU/mL  1.070   ACETYLCHOLINE BINDING AB      0.0 - 0.4 nmol/L  0.0   ACETYLCHOLINE BLOCKING AB      0 - 26 %  4   JESSIKA SCREEN      Negative  Negative   FERRITIN      12.0 - 240.0 ng/m unremarkable. - Lab work up to date is unrevealing. MG panel neg. 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  EMG/NCS will be helpful here      Valeriano

## 2018-09-25 NOTE — ED INITIAL ASSESSMENT (HPI)
Pt presents with puncture wounds to right hand from accidental dogbite, 3 total, 1 to top hand ansd 2 to palm, pt's own dog playing with toy, mistaken hand for toy,

## 2018-09-25 NOTE — ED NOTES
Casimiro Han pct at bedside for asepsis and sterile dressing, pt tolerating well, pt refusing motrin stating that it does not work for her, tylenol offered, pt will take meds at home, md aware

## 2018-09-25 NOTE — ED PROVIDER NOTES
Patient Seen in: [facility] Emergency Department    History   Patient presents with:  Bite (integumentary)    Stated Complaint: dog bite to hand    HPI    19-year-old female presents with a dog bite to the right hand.   She reports that her dog who i bleeding at this time. Moderate tenderness on palpation of all of these areas. No significant ecchymosis. There is no tenderness on palpation along the wrist or forearm. No erythema or warmth to suggest tracking infection.   Normal peripheral pulses   [address] [address] [address] [place] [address]    Schedule an appointment as soon as possible for a visit          Medications Prescribed:  Current Discharge Medication List    START taking these medications    Clindamycin HCl 300 MG Oral Cap  Take 1 capsul

## 2018-09-26 ENCOUNTER — TELEPHONE (OUTPATIENT)
Dept: FAMILY MEDICINE CLINIC | Facility: CLINIC | Age: 43
End: 2018-09-26

## 2018-09-26 NOTE — TELEPHONE ENCOUNTER
Pt was seen in ED on 9/25/18 for a dog bite to her hand. She was to follow up in 1 to 2 days with LE. Pt doesn't feel she needs to follow up at this time. She said they told her to watch for signs of infection. She said the hand looks good right now.

## 2018-10-01 ENCOUNTER — OFFICE VISIT (OUTPATIENT)
Dept: SLEEP CENTER | Facility: HOSPITAL | Age: 43
End: 2018-10-01
Attending: PHYSICIAN ASSISTANT
Payer: COMMERCIAL

## 2018-10-01 DIAGNOSIS — F51.5 NIGHTMARES: ICD-10-CM

## 2018-10-01 DIAGNOSIS — G47.9 SLEEP DISTURBANCE: ICD-10-CM

## 2018-10-01 PROCEDURE — 95810 POLYSOM 6/> YRS 4/> PARAM: CPT

## 2018-10-02 NOTE — PROCEDURES
[address] [address],[facility] [address]       Accredited by the [facility] ([facility])    PATIENT'S NAME:        Hansel Mack  ATTENDING PHYSICIAN:   Dmitri Larsen M.D.   REFERRING PHYSICIAN:     PATIENT ACCOUNT #: minutes, wake after sleep onset 13 minutes, for a sleep efficiency of 96%. Sleep stage breakdown as follows:  Stage 1, less than 1%; stage 2, 75%; stage 3, 9%; stage REM 16%.       RESPIRATORY MEASURES:  The patient had a total of 2 obstructive hypopneas,

## 2018-10-11 ENCOUNTER — OFFICE VISIT (OUTPATIENT)
Dept: ELECTROPHYSIOLOGY | Facility: HOSPITAL | Age: 43
End: 2018-10-11
Attending: PHYSICIAN ASSISTANT
Payer: COMMERCIAL

## 2018-10-11 DIAGNOSIS — M62.81 MUSCLE WEAKNESS: ICD-10-CM

## 2018-10-11 DIAGNOSIS — R20.2 PARESTHESIA: ICD-10-CM

## 2018-10-11 DIAGNOSIS — R25.3 MUSCLE TWITCHING: Primary | ICD-10-CM

## 2018-10-11 PROCEDURE — 95913 NRV CNDJ TEST 13/> STUDIES: CPT | Performed by: OTHER

## 2018-10-11 PROCEDURE — 95885 MUSC TST DONE W/NERV TST LIM: CPT | Performed by: OTHER

## 2018-10-12 NOTE — PROCEDURES
[facility], [address] [place] [address]      PATIENT'S NAME: Dany GARAY   REFERRING PHYSICIAN: Alvaro Payne.  Brigido Knight ACCOUNT #: [de-identified] LOCATION: [facility]   MEDICAL RECORD #: [id number] DATE OF BIRTH does not rule out benign fasciculation condition.      Dictated By Jesse Santoro M.D.  d:   10/11/2018 15:39:49  t:   10/11/2018 15:53:56  [facility]  [id number]  ZULEMA/

## 2018-10-17 ENCOUNTER — TELEPHONE (OUTPATIENT)
Dept: FAMILY MEDICINE CLINIC | Facility: CLINIC | Age: 43
End: 2018-10-17

## 2018-10-17 NOTE — TELEPHONE ENCOUNTER
EMG results received. Is normal. Please call patient.  Recommend patient follow up with her neurologist.

## 2018-10-19 ENCOUNTER — IMMUNIZATION (OUTPATIENT)
Dept: FAMILY MEDICINE CLINIC | Facility: CLINIC | Age: 43
End: 2018-10-19
Payer: COMMERCIAL

## 2018-10-19 DIAGNOSIS — Z23 NEED FOR VACCINATION: ICD-10-CM

## 2018-10-19 PROCEDURE — 90686 IIV4 VACC NO PRSV 0.5 ML IM: CPT | Performed by: FAMILY MEDICINE

## 2018-10-19 PROCEDURE — 90471 IMMUNIZATION ADMIN: CPT | Performed by: FAMILY MEDICINE

## 2018-11-14 ENCOUNTER — OFFICE VISIT (OUTPATIENT)
Dept: NEUROLOGY | Facility: CLINIC | Age: 43
End: 2018-11-14
Payer: COMMERCIAL

## 2018-11-14 VITALS
WEIGHT: 145 LBS | RESPIRATION RATE: 14 BRPM | BODY MASS INDEX: 22 KG/M2 | HEART RATE: 72 BPM | SYSTOLIC BLOOD PRESSURE: 118 MMHG | DIASTOLIC BLOOD PRESSURE: 70 MMHG

## 2018-11-14 DIAGNOSIS — R25.3 MUSCLE TWITCHING: Primary | ICD-10-CM

## 2018-11-14 PROCEDURE — 99213 OFFICE O/P EST LOW 20 MIN: CPT | Performed by: OTHER

## 2018-11-14 NOTE — PROGRESS NOTES
Neurology H&P    Lesle End Patient Status:  No patient class for patient encounter    1975 MRN [id number]   Location [address] [facility], [address] [place] [address] Drive, [address] [facility] Attending No att. providers found   Hosp Day # 0 PCP Scar Renya DO she sees \"creepy crawling things\" everywhere and feels that they are crawling down her throat. She reports that she wakes crying and feeling heart palpitations and is very fearful.  She states that she fell down the stairs once (no LOC ot hitting her head Sister    • Breast Cancer Paternal Grandmother [de-identified]   • Breast Cancer Maternal Aunt 40       ROS:  Gen: no unexplained weight loss  Vision: no vision changes, no new blurry or double vision  Head and Neck: no eye or ear discharge  Pulmonary: no SOB, no new c all modalities     COORDINATION:  No dysmetria, or intention tremors; normal RADHA, no truncal ataxia    REFLEXES: 3+ at biceps, 2+ triceps, 3+ at patella + suprapatellar reflexes, 2+ at the ankles. Toes downgoing; neg.  Arnaldo's, neg. crossed adductors ALKALINE PHOSPHATASE      37 - 98 U/L  47   Total Bilirubin      0.1 - 2.0 mg/dL  1.8   TOTAL PROTEIN      6.1 - 8.3 g/dL  7.5   Albumin      3.5 - 4.8 g/dL  4.1   GLOBULIN, TOTAL      2.5 - 4.0 g/dL  3.4   A/G RATIO      1.0 - 2.0  1.2   Iron, Serum see if this helps. I will see her again in 4 moths or sooner if needed for reassessment and possibly repeating ELOY      Plan:  1. Fasciculations  - MRI alyssa was reviewed in detail today,. Essentially unremarkable. - Lab work up to date is unrevealing.  Rita Corona

## 2018-11-14 NOTE — PATIENT INSTRUCTIONS
Refill policies:    • Allow 2-3 business days for refills; controlled substances may take longer.   • Contact your pharmacy at least 5 days prior to running out of medication and have them send an electronic request or submit request through the “request re entire amount billed. Precertification and Prior Authorizations: If your physician has recommended that you have a procedure or additional testing performed.   [facility]) will contact your insurance carrier to obtain pre-certi

## 2018-11-19 NOTE — PROGRESS NOTES
HPI:   Samara Prather is a 41year old female who c/o follow up depression. Would like to restart wellbutrin. Tolerates well. Has tried prozac, paxil and effexor but did not tolerate due to side effects.     Current stressors include:    Season changing diet.  Counseling offered, pt declined at this time. Follow up in 1 month or sooner if needed. Renetta Mandy was given an opportunity to ask questions and verbalized understanding of care.

## 2018-12-21 ENCOUNTER — PATIENT MESSAGE (OUTPATIENT)
Dept: FAMILY MEDICINE CLINIC | Facility: CLINIC | Age: 43
End: 2018-12-21

## 2018-12-21 ENCOUNTER — TELEPHONE (OUTPATIENT)
Dept: FAMILY MEDICINE CLINIC | Facility: CLINIC | Age: 43
End: 2018-12-21

## 2018-12-21 DIAGNOSIS — F41.9 ANXIETY AND DEPRESSION: ICD-10-CM

## 2018-12-21 DIAGNOSIS — F32.A ANXIETY AND DEPRESSION: ICD-10-CM

## 2018-12-21 RX ORDER — BUPROPION HYDROCHLORIDE 150 MG/1
TABLET ORAL
Qty: 180 TABLET | Refills: 0 | Status: SHIPPED | OUTPATIENT
Start: 2018-12-21 | End: 2019-04-24

## 2018-12-21 NOTE — TELEPHONE ENCOUNTER
From: Cristhian Mcbride  To: TAHIR Pope  Sent: 12/21/2018 11:55 AM CST  Subject: Prescription Question    I was prescribed Wellbutrin XL in November and was at 200mg when I requested a refill.  I was taking one XL tablet in the morning and one in

## 2019-02-28 VITALS — HEIGHT: 68 IN | HEART RATE: 76 BPM | DIASTOLIC BLOOD PRESSURE: 60 MMHG | SYSTOLIC BLOOD PRESSURE: 104 MMHG

## 2019-04-24 DIAGNOSIS — F32.A ANXIETY AND DEPRESSION: ICD-10-CM

## 2019-04-24 DIAGNOSIS — F41.9 ANXIETY AND DEPRESSION: ICD-10-CM

## 2019-04-24 RX ORDER — BUPROPION HYDROCHLORIDE 150 MG/1
TABLET ORAL
Qty: 60 TABLET | Refills: 0 | Status: SHIPPED | OUTPATIENT
Start: 2019-04-24 | End: 2019-05-17

## 2019-04-24 NOTE — TELEPHONE ENCOUNTER
Rx Request  BuPROPion HCl ER, XL, 150 MG Oral Tablet 24 Hr    Disp:    180                R: 0    Associated Dx:  Anxiety and depression    Last Visit:  12/28/2018    Last Refilled: 12/21/2018

## 2019-05-17 NOTE — PROGRESS NOTES
Teena Koch is a 45year old female here to discuss depression. On buproprion  for control.   well controlled - pt has gone off and on it for years ; pt feels she has seasonal affective disorder   no SI/HI  Pt continues to have disruptive sleep and

## 2019-05-23 ENCOUNTER — OFFICE VISIT (OUTPATIENT)
Dept: FAMILY MEDICINE CLINIC | Facility: CLINIC | Age: 44
End: 2019-05-23
Payer: COMMERCIAL

## 2019-05-23 VITALS
OXYGEN SATURATION: 99 % | DIASTOLIC BLOOD PRESSURE: 88 MMHG | HEIGHT: 68 IN | SYSTOLIC BLOOD PRESSURE: 122 MMHG | WEIGHT: 142 LBS | BODY MASS INDEX: 21.52 KG/M2 | HEART RATE: 89 BPM | RESPIRATION RATE: 16 BRPM

## 2019-05-23 DIAGNOSIS — K62.5 RECTAL BLEEDING: Primary | ICD-10-CM

## 2019-05-23 DIAGNOSIS — K64.8 OTHER HEMORRHOIDS: ICD-10-CM

## 2019-05-23 PROCEDURE — 99214 OFFICE O/P EST MOD 30 MIN: CPT | Performed by: PHYSICIAN ASSISTANT

## 2019-05-23 NOTE — PROGRESS NOTES
HPI:    Patient ID: Deysi López is a 45year old female. HPI   Patient presents today c/o rectal bleeding for the last 2 months. initialy noticed bright red blood on tissue paper after wiping. Endorses having more constipation around that time.   Savannah Thosmon weakness, light-headedness and headaches. Psychiatric/Behavioral: The patient is nervous/anxious. Current Outpatient Medications:  Hydrocortisone Acetate 25 MG Rectal Suppos Place 1 suppository (25 mg total) rectally 2 (two) times daily.  Mario Gordillo progressively gotten worse and now passing small darker colored clots. Physical exam notable for generalized abdominal tenderness, distention, internal/external hemorrhoids.   Given her family history of colon cancer in multiple relatives and ulcerative co

## 2019-05-23 NOTE — PATIENT INSTRUCTIONS
Placed rectal suppository twice daily or after bowel movement. May use over-the-counter hydrocortisone cream or Preparation H. Tucks pads. normal (ped)...

## 2019-05-24 ENCOUNTER — APPOINTMENT (OUTPATIENT)
Dept: LAB | Age: 44
End: 2019-05-24
Attending: PHYSICIAN ASSISTANT
Payer: COMMERCIAL

## 2019-05-24 PROCEDURE — 85025 COMPLETE CBC W/AUTO DIFF WBC: CPT | Performed by: PHYSICIAN ASSISTANT

## 2019-05-24 PROCEDURE — 36415 COLL VENOUS BLD VENIPUNCTURE: CPT | Performed by: PHYSICIAN ASSISTANT

## 2019-09-26 ENCOUNTER — OFFICE VISIT (OUTPATIENT)
Dept: FAMILY MEDICINE CLINIC | Facility: CLINIC | Age: 44
End: 2019-09-26
Payer: COMMERCIAL

## 2019-09-26 VITALS
DIASTOLIC BLOOD PRESSURE: 64 MMHG | WEIGHT: 143 LBS | HEART RATE: 62 BPM | HEIGHT: 68 IN | SYSTOLIC BLOOD PRESSURE: 110 MMHG | RESPIRATION RATE: 14 BRPM | BODY MASS INDEX: 21.67 KG/M2

## 2019-09-26 DIAGNOSIS — S16.1XXA STRAIN OF NECK MUSCLE, INITIAL ENCOUNTER: ICD-10-CM

## 2019-09-26 DIAGNOSIS — M62.830 MUSCLE SPASM OF BACK: ICD-10-CM
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
## 2019-09-26 PROCEDURE — 99213 OFFICE O/P EST LOW 20 MIN: CPT | Performed by: PHYSICIAN ASSISTANT

## 2019-09-26 RX ORDER — MELOXICAM 15 MG/1
15 TABLET ORAL DAILY
Qty: 30 TABLET | Refills: 0 | Status: SHIPPED | OUTPATIENT
Start: 2019-09-26 | End: 2019-10-25 | Stop reason: ALTCHOICE

## 2019-09-26 RX ORDER — CYCLOBENZAPRINE HCL 5 MG
5 TABLET ORAL 3 TIMES DAILY PRN
Qty: 15 TABLET | Refills: 0 | Status: SHIPPED | OUTPATIENT
Start: 2019-09-26 | End: 2019-10-25 | Stop reason: ALTCHOICE

## 2019-09-26 RX ORDER — COVID-19 ANTIGEN TEST
220 KIT MISCELLANEOUS DAILY PRN
COMMUNITY
End: 2019-10-25 | Stop reason: ALTCHOICE
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
## 2019-10-25 ENCOUNTER — OFFICE VISIT (OUTPATIENT)
Dept: FAMILY MEDICINE CLINIC | Facility: CLINIC | Age: 44
End: 2019-10-25
Payer: COMMERCIAL

## 2019-10-25 ENCOUNTER — PATIENT MESSAGE (OUTPATIENT)
Dept: FAMILY MEDICINE CLINIC | Facility: CLINIC | Age: 44
End: 2019-10-25

## 2019-10-25 ENCOUNTER — APPOINTMENT (OUTPATIENT)
Dept: LAB | Age: 44
End: 2019-10-25
Attending: PHYSICIAN ASSISTANT
Payer: COMMERCIAL

## 2019-10-25 VITALS
OXYGEN SATURATION: 99 % | TEMPERATURE: 98 F | DIASTOLIC BLOOD PRESSURE: 84 MMHG | HEART RATE: 77 BPM | HEIGHT: 68 IN | BODY MASS INDEX: 21.43 KG/M2 | SYSTOLIC BLOOD PRESSURE: 120 MMHG | WEIGHT: 141.38 LBS | RESPIRATION RATE: 12 BRPM

## 2019-10-25 DIAGNOSIS — E06.3 HASHIMOTO'S THYROIDITIS: Primary | ICD-10-CM

## 2019-10-25 DIAGNOSIS — R41.840 LACK OF CONCENTRATION: ICD-10-CM

## 2019-10-25 DIAGNOSIS — E06.3 HASHIMOTO'S THYROIDITIS: ICD-10-CM

## 2019-10-25 PROCEDURE — 84481 FREE ASSAY (FT-3): CPT

## 2019-10-25 PROCEDURE — 36415 COLL VENOUS BLD VENIPUNCTURE: CPT

## 2019-10-25 PROCEDURE — 99214 OFFICE O/P EST MOD 30 MIN: CPT | Performed by: PHYSICIAN ASSISTANT

## 2019-10-25 PROCEDURE — 84443 ASSAY THYROID STIM HORMONE: CPT

## 2019-10-25 PROCEDURE — 84439 ASSAY OF FREE THYROXINE: CPT

## 2019-10-25 RX ORDER — ATOMOXETINE 25 MG/1
25 CAPSULE ORAL DAILY
Qty: 30 CAPSULE | Refills: 3 | Status: SHIPPED | OUTPATIENT
Start: 2019-10-25 | End: 2019-10-28

## 2019-10-25 RX ORDER — LUTEIN 20 MG
10 CAPSULE ORAL 2 TIMES DAILY
COMMUNITY
End: 2021-12-13 | Stop reason: ALTCHOICE

## 2019-10-25 NOTE — PROGRESS NOTES
HPI:    Patient ID: Jordan Frye is a 45year old female. HPI   Patient with history of Hashimoto's, anxiety, and depression presents for follow-up. She is currently on bupropion 150 mg once daily for seasonal affective disorder.   States she gets HCl ER, XL, 150 MG Oral Tablet 24 Hr, Take one tablet twice daily. , Disp: 180 tablet, Rfl: 1  Cholecalciferol (HM VITAMIN D3) 4000 units Oral Cap, Take by mouth daily.   , Disp: , Rfl:       Allergies:  Triptans                HIVES   PHYSICAL EXAM:   Physi

## 2019-10-28 ENCOUNTER — IMMUNIZATION (OUTPATIENT)
Dept: FAMILY MEDICINE CLINIC | Facility: CLINIC | Age: 44
End: 2019-10-28
Payer: COMMERCIAL

## 2019-10-28 DIAGNOSIS — Z23 NEED FOR VACCINATION: ICD-10-CM

## 2019-10-28 PROCEDURE — 90686 IIV4 VACC NO PRSV 0.5 ML IM: CPT | Performed by: FAMILY MEDICINE

## 2019-10-28 PROCEDURE — 90471 IMMUNIZATION ADMIN: CPT | Performed by: FAMILY MEDICINE

## 2019-10-28 RX ORDER — BUPROPION HYDROCHLORIDE 150 MG/1
150 TABLET ORAL 2 TIMES DAILY
Qty: 180 TABLET | Refills: 1 | Status: SHIPPED | OUTPATIENT
Start: 2019-10-28 | End: 2020-01-09

## 2019-10-28 NOTE — TELEPHONE ENCOUNTER
From: Gus Enrique  To: Saloni Caceres PA-C  Sent: 10/25/2019 2:50 PM CDT  Subject: Prescription Question    I was prescribed Strattera and this is not something I am able to afford so I will not be able to use this medication.    I have exhausted insu

## 2019-12-30 ENCOUNTER — PATIENT MESSAGE (OUTPATIENT)
Dept: FAMILY MEDICINE CLINIC | Facility: CLINIC | Age: 44
End: 2019-12-30

## 2019-12-30 NOTE — TELEPHONE ENCOUNTER
From: Agatha Mcdaniel  To: Cherrie Fowler PA-C  Sent: 12/30/2019 10:09 AM CST  Subject: Prescription Question    I was previously in and was unable to afford the Strattera prescription. You told me to let you know if I wanted to try a low dose Adderall.

## 2020-01-09 ENCOUNTER — OFFICE VISIT (OUTPATIENT)
Dept: FAMILY MEDICINE CLINIC | Facility: CLINIC | Age: 45
End: 2020-01-09
Payer: COMMERCIAL

## 2020-01-09 VITALS
HEIGHT: 68 IN | DIASTOLIC BLOOD PRESSURE: 68 MMHG | OXYGEN SATURATION: 98 % | HEART RATE: 68 BPM | WEIGHT: 137 LBS | BODY MASS INDEX: 20.76 KG/M2 | SYSTOLIC BLOOD PRESSURE: 104 MMHG | RESPIRATION RATE: 18 BRPM

## 2020-01-09 DIAGNOSIS — Z87.898 HISTORY OF PALPITATIONS: ICD-10-CM

## 2020-01-09 DIAGNOSIS — R41.840 LACK OF CONCENTRATION: Primary | ICD-10-CM

## 2020-01-09 PROCEDURE — 99213 OFFICE O/P EST LOW 20 MIN: CPT | Performed by: PHYSICIAN ASSISTANT

## 2020-01-09 PROCEDURE — 93000 ELECTROCARDIOGRAM COMPLETE: CPT | Performed by: PHYSICIAN ASSISTANT

## 2020-01-09 RX ORDER — DEXTROAMPHETAMINE SACCHARATE, AMPHETAMINE ASPARTATE, DEXTROAMPHETAMINE SULFATE AND AMPHETAMINE SULFATE 2.5; 2.5; 2.5; 2.5 MG/1; MG/1; MG/1; MG/1
10 TABLET ORAL DAILY
Qty: 30 TABLET | Refills: 0 | Status: SHIPPED | OUTPATIENT
Start: 2020-03-11 | End: 2020-01-27

## 2020-01-09 RX ORDER — DEXTROAMPHETAMINE SACCHARATE, AMPHETAMINE ASPARTATE, DEXTROAMPHETAMINE SULFATE AND AMPHETAMINE SULFATE 2.5; 2.5; 2.5; 2.5 MG/1; MG/1; MG/1; MG/1
10 TABLET ORAL DAILY
Qty: 30 TABLET | Refills: 0 | Status: SHIPPED | OUTPATIENT
Start: 2020-02-09 | End: 2020-01-27

## 2020-01-09 RX ORDER — BUPROPION HYDROCHLORIDE 150 MG/1
150 TABLET ORAL 2 TIMES DAILY
Qty: 180 TABLET | Refills: 1 | Status: SHIPPED | OUTPATIENT
Start: 2020-01-09 | End: 2020-07-16

## 2020-01-09 RX ORDER — DEXTROAMPHETAMINE SACCHARATE, AMPHETAMINE ASPARTATE, DEXTROAMPHETAMINE SULFATE AND AMPHETAMINE SULFATE 2.5; 2.5; 2.5; 2.5 MG/1; MG/1; MG/1; MG/1
10 TABLET ORAL DAILY
Qty: 30 TABLET | Refills: 0 | Status: SHIPPED | OUTPATIENT
Start: 2020-01-09 | End: 2020-01-27

## 2020-01-09 NOTE — PROGRESS NOTES
HPI:    Patient ID: Teena Koch is a 45year old female. HPI   Patient presents today in follow-up of lack of concentration. She was last seen in the office in October.   At that time was struggling with decreased concentration, inattention, and e Oral Cap Take by mouth daily. Allergies:  Triptans                HIVES   PHYSICAL EXAM:   Physical Exam   Nursing note and vitals reviewed. Constitutional: She appears well-developed and well-nourished.    Psychiatric: She has a normal mood and a

## 2020-01-16 ENCOUNTER — PATIENT MESSAGE (OUTPATIENT)
Dept: FAMILY MEDICINE CLINIC | Facility: CLINIC | Age: 45
End: 2020-01-16

## 2020-01-16 NOTE — TELEPHONE ENCOUNTER
From: Jessenia West  To: Austin Reyes PA-C  Sent: 1/16/2020 8:44 AM CST  Subject: Prescription Question    I was prescribed Adderall 10mg IR a week ago and have not had any response to the medication at all, good or bad.  I am supposed to follow up in

## 2020-01-27 ENCOUNTER — PATIENT MESSAGE (OUTPATIENT)
Dept: FAMILY MEDICINE CLINIC | Facility: CLINIC | Age: 45
End: 2020-01-27

## 2020-01-28 ENCOUNTER — PATIENT MESSAGE (OUTPATIENT)
Dept: FAMILY MEDICINE CLINIC | Facility: CLINIC | Age: 45
End: 2020-01-28

## 2020-01-28 NOTE — TELEPHONE ENCOUNTER
From: Cherylene Estimable  To: Jannette Tovar PA-C  Sent: 1/27/2020 6:05 PM CST  Subject: Prescription Question    You prescribed vynase today. The reason I requested a switch was because I cannot afford strattera. As we discussed at my last appointment.  Maddi Kelley

## 2020-01-29 RX ORDER — METHYLPHENIDATE HYDROCHLORIDE 10 MG/1
10 TABLET ORAL 2 TIMES DAILY
Qty: 60 TABLET | Refills: 0 | Status: SHIPPED | OUTPATIENT
Start: 2020-01-29 | End: 2020-03-23

## 2020-01-29 NOTE — TELEPHONE ENCOUNTER
From: Samara Prather  To: Fred Eastman PA-C  Sent: 1/28/2020 6:21 PM CST  Subject: Prescription Question    I am leaning towards giving Ritalin a shot over an extended release or what didn't work for me, unless you think it might be better.  I don't kn

## 2020-03-21 ENCOUNTER — PATIENT MESSAGE (OUTPATIENT)
Dept: FAMILY MEDICINE CLINIC | Facility: CLINIC | Age: 45
End: 2020-03-21

## 2020-03-23 RX ORDER — METHYLPHENIDATE HYDROCHLORIDE 10 MG/1
10 TABLET ORAL 2 TIMES DAILY
Qty: 60 TABLET | Refills: 0 | Status: SHIPPED | OUTPATIENT
Start: 2020-03-23 | End: 2020-04-22

## 2020-05-18 ENCOUNTER — E-VISIT (OUTPATIENT)
Dept: FAMILY MEDICINE CLINIC | Facility: CLINIC | Age: 45
End: 2020-05-18

## 2020-05-18 DIAGNOSIS — Z20.822 SUSPECTED COVID-19 VIRUS INFECTION: Primary | ICD-10-CM

## 2020-05-18 RX ORDER — AZITHROMYCIN 250 MG/1
TABLET, FILM COATED ORAL
Qty: 6 TABLET | Refills: 0 | Status: SHIPPED | OUTPATIENT
Start: 2020-05-18 | End: 2020-05-23

## 2020-05-18 RX ORDER — BENZONATATE 200 MG/1
CAPSULE ORAL
Qty: 30 CAPSULE | Refills: 0 | Status: SHIPPED | OUTPATIENT
Start: 2020-05-18 | End: 2020-07-16 | Stop reason: ALTCHOICE

## 2020-05-19 ENCOUNTER — LAB ENCOUNTER (OUTPATIENT)
Dept: LAB | Facility: HOSPITAL | Age: 45
End: 2020-05-19
Attending: NURSE PRACTITIONER
Payer: COMMERCIAL

## 2020-05-19 DIAGNOSIS — Z20.822 SUSPECTED COVID-19 VIRUS INFECTION: ICD-10-CM

## 2020-06-25 RX ORDER — ATOMOXETINE 25 MG/1
25 CAPSULE ORAL DAILY
Qty: 30 CAPSULE | Refills: 0 | OUTPATIENT
Start: 2020-06-25

## 2020-06-25 NOTE — TELEPHONE ENCOUNTER
Rx Request  Atomoxetine HCl 25 MG Oral Cap     Disp:     30               R: 0    Last Visit: 01/09/2020    Last Refilled: 10/25/2019

## 2020-07-16 ENCOUNTER — HOSPITAL ENCOUNTER (OUTPATIENT)
Dept: GENERAL RADIOLOGY | Age: 45
Discharge: HOME | End: 2020-07-16
Attending: PHYSICIAN ASSISTANT
Payer: COMMERCIAL

## 2020-07-16 ENCOUNTER — OFFICE VISIT (OUTPATIENT)
Dept: FAMILY MEDICINE CLINIC | Facility: CLINIC | Age: 45
End: 2020-07-16
Payer: COMMERCIAL

## 2020-07-16 VITALS
HEART RATE: 68 BPM | WEIGHT: 138 LBS | BODY MASS INDEX: 20.92 KG/M2 | SYSTOLIC BLOOD PRESSURE: 110 MMHG | DIASTOLIC BLOOD PRESSURE: 70 MMHG | TEMPERATURE: 98 F | HEIGHT: 68 IN

## 2020-07-16 DIAGNOSIS — M25.551 CHRONIC RIGHT HIP PAIN: Primary | ICD-10-CM

## 2020-07-16 DIAGNOSIS — G89.29 CHRONIC RIGHT HIP PAIN: Primary | ICD-10-CM

## 2020-07-16 DIAGNOSIS — G89.29 CHRONIC RIGHT HIP PAIN: ICD-10-CM

## 2020-07-16 DIAGNOSIS — M25.551 CHRONIC RIGHT HIP PAIN: ICD-10-CM

## 2020-07-16 DIAGNOSIS — R41.840 LACK OF CONCENTRATION: ICD-10-CM

## 2020-07-16 PROCEDURE — 3008F BODY MASS INDEX DOCD: CPT | Performed by: PHYSICIAN ASSISTANT

## 2020-07-16 PROCEDURE — 73502 X-RAY EXAM HIP UNI 2-3 VIEWS: CPT | Performed by: PHYSICIAN ASSISTANT

## 2020-07-16 PROCEDURE — 3078F DIAST BP <80 MM HG: CPT | Performed by: PHYSICIAN ASSISTANT

## 2020-07-16 PROCEDURE — 99214 OFFICE O/P EST MOD 30 MIN: CPT | Performed by: PHYSICIAN ASSISTANT

## 2020-07-16 PROCEDURE — 3074F SYST BP LT 130 MM HG: CPT | Performed by: PHYSICIAN ASSISTANT

## 2020-07-16 RX ORDER — ATOMOXETINE 40 MG/1
40 CAPSULE ORAL DAILY
Qty: 30 CAPSULE | Refills: 2 | Status: SHIPPED | OUTPATIENT
Start: 2020-07-16 | End: 2021-12-13 | Stop reason: ALTCHOICE

## 2020-07-16 NOTE — PROGRESS NOTES
HPI:    Patient ID: Yovany Milton is a 39year old female. HPI   Patient presents today c/o right hip pain for a few months. He actually has had pain off and on for several years.   She sustained a fall shortly after she birth to twins and pain as b depressed mood. Current Outpatient Medications   Medication Sig Dispense Refill   • Atomoxetine HCl 40 MG Oral Cap Take 1 capsule (40 mg total) by mouth daily.  30 capsule 2   • Lutein-Zeaxanthin 20-1 MG Oral Cap Take 10 mg by mouth 2 (two) times Cap 30 capsule 2     Sig: Take 1 capsule (40 mg total) by mouth daily.        Imaging & Referrals:  OP REFERRAL TO [facility]         EK#[id number]

## 2021-12-13 ENCOUNTER — OFFICE VISIT (OUTPATIENT)
Dept: FAMILY MEDICINE CLINIC | Facility: CLINIC | Age: 46
End: 2021-12-13
Payer: COMMERCIAL

## 2021-12-13 ENCOUNTER — LAB ENCOUNTER (OUTPATIENT)
Dept: LAB | Age: 46
End: 2021-12-13
Attending: PHYSICIAN ASSISTANT
Payer: COMMERCIAL

## 2021-12-13 VITALS
BODY MASS INDEX: 22.43 KG/M2 | HEART RATE: 76 BPM | TEMPERATURE: 97 F | RESPIRATION RATE: 16 BRPM | WEIGHT: 148 LBS | SYSTOLIC BLOOD PRESSURE: 100 MMHG | DIASTOLIC BLOOD PRESSURE: 72 MMHG | OXYGEN SATURATION: 98 % | HEIGHT: 68 IN

## 2021-12-13 DIAGNOSIS — Z12.31 VISIT FOR SCREENING MAMMOGRAM: ICD-10-CM

## 2021-12-13 DIAGNOSIS — E06.3 HASHIMOTO'S THYROIDITIS: ICD-10-CM

## 2021-12-13 DIAGNOSIS — Z00.00 ROUTINE GENERAL MEDICAL EXAMINATION AT A HEALTH CARE FACILITY: ICD-10-CM

## 2021-12-13 DIAGNOSIS — F41.9 ANXIETY: ICD-10-CM

## 2021-12-13 DIAGNOSIS — Z00.00 ROUTINE GENERAL MEDICAL EXAMINATION AT A HEALTH CARE FACILITY: Primary | ICD-10-CM

## 2021-12-13 PROCEDURE — 84443 ASSAY THYROID STIM HORMONE: CPT

## 2021-12-13 PROCEDURE — 85025 COMPLETE CBC W/AUTO DIFF WBC: CPT

## 2021-12-13 PROCEDURE — 82607 VITAMIN B-12: CPT

## 2021-12-13 PROCEDURE — 36415 COLL VENOUS BLD VENIPUNCTURE: CPT

## 2021-12-13 PROCEDURE — 86376 MICROSOMAL ANTIBODY EACH: CPT

## 2021-12-13 PROCEDURE — 82306 VITAMIN D 25 HYDROXY: CPT

## 2021-12-13 PROCEDURE — 99396 PREV VISIT EST AGE 40-64: CPT | Performed by: PHYSICIAN ASSISTANT

## 2021-12-13 PROCEDURE — 80053 COMPREHEN METABOLIC PANEL: CPT

## 2021-12-13 PROCEDURE — 80061 LIPID PANEL: CPT

## 2021-12-13 PROCEDURE — 3074F SYST BP LT 130 MM HG: CPT | Performed by: PHYSICIAN ASSISTANT

## 2021-12-13 PROCEDURE — 3078F DIAST BP <80 MM HG: CPT | Performed by: PHYSICIAN ASSISTANT

## 2021-12-13 PROCEDURE — 3008F BODY MASS INDEX DOCD: CPT | Performed by: PHYSICIAN ASSISTANT

## 2021-12-13 PROCEDURE — 84439 ASSAY OF FREE THYROXINE: CPT

## 2021-12-13 PROCEDURE — 86800 THYROGLOBULIN ANTIBODY: CPT

## 2021-12-13 RX ORDER — BUSPIRONE HYDROCHLORIDE 5 MG/1
5 TABLET ORAL 2 TIMES DAILY
Qty: 60 TABLET | Refills: 1 | Status: SHIPPED | OUTPATIENT
Start: 2021-12-13

## 2021-12-13 NOTE — PROGRESS NOTES
Subjective:   Patient ID: Pascual Johnson is a 42year old female.     HPI   Patient presents today requesting physical exam.     Seasonal depression worse this year  No energy  fatigued  Felt like this when her TSH was abnormal - has not been checked rec busPIRone 5 MG Oral Tab Take 1 tablet (5 mg total) by mouth 2 (two) times a day. 60 tablet 1   • Cholecalciferol (HM VITAMIN D3) 4000 units Oral Cap Take by mouth daily.          Allergies:  Triptans                HIVES    Objective:   Physical Exam  Vital time.   Psychiatric:         Mood and Affect: Mood is anxious. Affect is tearful. Behavior: Behavior normal.         Assessment & Plan:   1. Routine general medical examination at a health care facility  Patient here with active issues as below.   P 2D+3D SCREENING BILAT (CPT=77067/21632)

## 2021-12-22 ENCOUNTER — HOSPITAL ENCOUNTER (OUTPATIENT)
Dept: MAMMOGRAPHY | Age: 46
Discharge: HOME | End: 2021-12-22
Attending: PHYSICIAN ASSISTANT
Payer: COMMERCIAL

## 2021-12-22 DIAGNOSIS — Z12.31 VISIT FOR SCREENING MAMMOGRAM: ICD-10-CM

## 2021-12-22 PROCEDURE — 77063 BREAST TOMOSYNTHESIS BI: CPT | Performed by: PHYSICIAN ASSISTANT

## 2021-12-22 PROCEDURE — 77067 SCR MAMMO BI INCL CAD: CPT | Performed by: PHYSICIAN ASSISTANT

## 2021-12-27 ENCOUNTER — HOSPITAL ENCOUNTER (OUTPATIENT)
Dept: ULTRASOUND IMAGING | Age: 46
Discharge: HOME | End: 2021-12-27
Attending: PHYSICIAN ASSISTANT
Payer: COMMERCIAL

## 2021-12-27 DIAGNOSIS — E06.3 HASHIMOTO'S THYROIDITIS: ICD-10-CM

## 2021-12-27 PROCEDURE — 76536 US EXAM OF HEAD AND NECK: CPT | Performed by: PHYSICIAN ASSISTANT

## 2022-02-10 ENCOUNTER — HOSPITAL ENCOUNTER (OUTPATIENT)
Dept: CT IMAGING | Facility: HOSPITAL | Age: 47
Discharge: HOME | End: 2022-02-10
Attending: OTOLARYNGOLOGY
Payer: COMMERCIAL

## 2022-02-10 DIAGNOSIS — R59.1 LYMPHADENOPATHY: ICD-10-CM

## 2022-02-10 DIAGNOSIS — R59.9 REACTIVE LYMPHADENOPATHY: ICD-10-CM

## 2022-02-10 LAB — CREAT BLD-MCNC: 0.6 MG/DL

## 2022-02-10 PROCEDURE — 70491 CT SOFT TISSUE NECK W/DYE: CPT | Performed by: OTOLARYNGOLOGY

## 2022-02-10 PROCEDURE — 82565 ASSAY OF CREATININE: CPT

## 2022-02-10 RX ORDER — IOHEXOL 350 MG/ML
50 INJECTION, SOLUTION INTRAVENOUS
Status: COMPLETED | OUTPATIENT
Start: 2022-02-10 | End: 2022-02-10

## 2022-02-10 RX ADMIN — IOHEXOL 50 ML: 350 INJECTION, SOLUTION INTRAVENOUS at 18:18:00

## 2022-02-11 RX ORDER — BUSPIRONE HYDROCHLORIDE 5 MG/1
5 TABLET ORAL 2 TIMES DAILY
Qty: 60 TABLET | Refills: 1 | Status: SHIPPED | OUTPATIENT
Start: 2022-02-11

## 2022-05-18 ENCOUNTER — OFFICE VISIT (OUTPATIENT)
Dept: FAMILY MEDICINE CLINIC | Facility: CLINIC | Age: 47
End: 2022-05-18
Payer: COMMERCIAL

## 2022-05-18 ENCOUNTER — HOSPITAL ENCOUNTER (OUTPATIENT)
Dept: GENERAL RADIOLOGY | Age: 47
Discharge: HOME | End: 2022-05-18
Attending: PHYSICIAN ASSISTANT
Payer: COMMERCIAL

## 2022-05-18 VITALS
HEIGHT: 68 IN | HEART RATE: 94 BPM | DIASTOLIC BLOOD PRESSURE: 72 MMHG | WEIGHT: 149 LBS | TEMPERATURE: 97 F | BODY MASS INDEX: 22.58 KG/M2 | OXYGEN SATURATION: 98 % | RESPIRATION RATE: 20 BRPM | SYSTOLIC BLOOD PRESSURE: 100 MMHG

## 2022-05-18 DIAGNOSIS — F32.A ANXIETY AND DEPRESSION: ICD-10-CM

## 2022-05-18 DIAGNOSIS — M53.3 COCCYDYNIA: ICD-10-CM

## 2022-05-18 DIAGNOSIS — M53.3 COCCYDYNIA: Primary | ICD-10-CM

## 2022-05-18 DIAGNOSIS — F41.9 ANXIETY AND DEPRESSION: ICD-10-CM

## 2022-05-18 PROCEDURE — 99214 OFFICE O/P EST MOD 30 MIN: CPT | Performed by: PHYSICIAN ASSISTANT

## 2022-05-18 PROCEDURE — 3078F DIAST BP <80 MM HG: CPT | Performed by: PHYSICIAN ASSISTANT

## 2022-05-18 PROCEDURE — 3008F BODY MASS INDEX DOCD: CPT | Performed by: PHYSICIAN ASSISTANT

## 2022-05-18 PROCEDURE — 72220 X-RAY EXAM SACRUM TAILBONE: CPT | Performed by: PHYSICIAN ASSISTANT

## 2022-05-18 PROCEDURE — 3074F SYST BP LT 130 MM HG: CPT | Performed by: PHYSICIAN ASSISTANT

## 2022-05-18 RX ORDER — DULOXETIN HYDROCHLORIDE 30 MG/1
30 CAPSULE, DELAYED RELEASE ORAL DAILY
Qty: 30 CAPSULE | Refills: 2 | Status: SHIPPED | OUTPATIENT
Start: 2022-05-18

## 2022-05-18 RX ORDER — BUSPIRONE HYDROCHLORIDE 5 MG/1
5 TABLET ORAL 2 TIMES DAILY
Qty: 180 TABLET | Refills: 1 | Status: SHIPPED | OUTPATIENT
Start: 2022-05-18

## 2022-05-19 ENCOUNTER — PATIENT MESSAGE (OUTPATIENT)
Dept: FAMILY MEDICINE CLINIC | Facility: CLINIC | Age: 47
End: 2022-05-19

## 2022-05-25 RX ORDER — BUPROPION HYDROCHLORIDE 150 MG/1
150 TABLET ORAL 2 TIMES DAILY
Qty: 60 TABLET | Refills: 2 | Status: SHIPPED | OUTPATIENT
Start: 2022-05-25

## 2022-07-03 DIAGNOSIS — F41.9 ANXIETY AND DEPRESSION: ICD-10-CM

## 2022-07-03 DIAGNOSIS — F32.A ANXIETY AND DEPRESSION: ICD-10-CM

## 2022-07-06 RX ORDER — BUPROPION HYDROCHLORIDE 150 MG/1
150 TABLET ORAL 2 TIMES DAILY
Qty: 60 TABLET | Refills: 2 | Status: SHIPPED | OUTPATIENT
Start: 2022-07-06

## 2022-07-06 RX ORDER — BUSPIRONE HYDROCHLORIDE 5 MG/1
5 TABLET ORAL 2 TIMES DAILY
Qty: 180 TABLET | Refills: 1 | Status: SHIPPED | OUTPATIENT
Start: 2022-07-06

## 2022-07-07 ENCOUNTER — TELEMEDICINE (OUTPATIENT)
Dept: FAMILY MEDICINE CLINIC | Facility: CLINIC | Age: 47
End: 2022-07-07
Payer: COMMERCIAL

## 2022-07-07 DIAGNOSIS — Z02.9 ADMINISTRATIVE ENCOUNTER: Primary | ICD-10-CM

## 2022-07-08 NOTE — PROGRESS NOTES
Patient and I were unable to connect via video. I called patient 2 separate times and left voicemail each time. Also reached out via [employer]. Will hopefully be able to connect soon but she can always reschedule if needed. No visit, no charge.

## 2022-07-14 NOTE — TELEPHONE ENCOUNTER
From: Mat Fernández  To: Tamar Gonzalez PA-C  Sent: 3/21/2020 8:15 AM CDT  Subject: Prescription Question    Can you please refill my Methylphenidate prescription? Not sure if I have to come in or not. Thank you!  used

## 2022-11-19 DIAGNOSIS — F32.A ANXIETY AND DEPRESSION: ICD-10-CM

## 2022-11-19 DIAGNOSIS — F41.9 ANXIETY AND DEPRESSION: ICD-10-CM

## 2022-11-21 RX ORDER — BUPROPION HYDROCHLORIDE 150 MG/1
150 TABLET ORAL 2 TIMES DAILY
Qty: 60 TABLET | Refills: 1 | Status: SHIPPED | OUTPATIENT
Start: 2022-11-21

## 2022-11-21 RX ORDER — BUSPIRONE HYDROCHLORIDE 5 MG/1
5 TABLET ORAL 2 TIMES DAILY
Qty: 180 TABLET | Refills: 1 | Status: SHIPPED | OUTPATIENT
Start: 2022-11-21

## 2023-01-16 ENCOUNTER — TELEPHONE (OUTPATIENT)
Facility: LOCATION | Age: 48
End: 2023-01-16

## 2023-02-21 NOTE — TELEPHONE ENCOUNTER
Alysa Porter, please see update from patient and request for bupropion.
From: Prudencio Cagle  To: Miles Joya PA-C  Sent: 5/19/2022 1:37 PM CDT  Subject: Cymbalta    I saw you yesterday and you prescribed Cymbalta. I have taken 2 doses. I am extremely nauseous, terrible headache, and my left pupil is huge, the right one is small. I am thinking this medication is not right for me. . Can I please go back on the Bupropion XL ? I took 150mg of XL morning and night. The side effects of cymbalta are a bit scary.
LMTCB Per Bisi, stop cymbalta. If no change or worsening symptoms in 24 hours to ER.
Noted, thank you for updates. Will send bupropion  mg BID, recommend follow up in 4 weeks to reassess.
99

## 2023-08-06 RX ORDER — BUPROPION HYDROCHLORIDE 150 MG/1
150 TABLET ORAL 2 TIMES DAILY
Qty: 60 TABLET | Refills: 0 | Status: SHIPPED | OUTPATIENT
Start: 2023-08-06

## 2024-07-24 ENCOUNTER — OFFICE VISIT (OUTPATIENT)
Dept: FAMILY MEDICINE CLINIC | Facility: CLINIC | Age: 49
End: 2024-07-24
Payer: COMMERCIAL

## 2024-07-24 ENCOUNTER — LAB ENCOUNTER (OUTPATIENT)
Dept: LAB | Age: 49
End: 2024-07-24
Attending: PHYSICIAN ASSISTANT
Payer: COMMERCIAL

## 2024-07-24 VITALS
BODY MASS INDEX: 23.49 KG/M2 | RESPIRATION RATE: 18 BRPM | HEIGHT: 68 IN | DIASTOLIC BLOOD PRESSURE: 80 MMHG | HEART RATE: 61 BPM | OXYGEN SATURATION: 99 % | SYSTOLIC BLOOD PRESSURE: 128 MMHG | WEIGHT: 155 LBS

## 2024-07-24 DIAGNOSIS — E55.9 VITAMIN D DEFICIENCY: ICD-10-CM

## 2024-07-24 DIAGNOSIS — E04.2 MULTIPLE THYROID NODULES: ICD-10-CM

## 2024-07-24 DIAGNOSIS — Z12.11 COLON CANCER SCREENING: ICD-10-CM

## 2024-07-24 DIAGNOSIS — Z00.00 ROUTINE GENERAL MEDICAL EXAMINATION AT A HEALTH CARE FACILITY: ICD-10-CM

## 2024-07-24 DIAGNOSIS — Z12.31 VISIT FOR SCREENING MAMMOGRAM: ICD-10-CM

## 2024-07-24 DIAGNOSIS — Z00.00 ROUTINE GENERAL MEDICAL EXAMINATION AT A HEALTH CARE FACILITY: Primary | ICD-10-CM

## 2024-07-24 DIAGNOSIS — E06.3 HASHIMOTO'S THYROIDITIS: ICD-10-CM

## 2024-07-24 DIAGNOSIS — N95.1 PERIMENOPAUSE: ICD-10-CM

## 2024-07-24 PROBLEM — F32.A ANXIETY AND DEPRESSION: Status: RESOLVED | Noted: 2018-06-11 | Resolved: 2024-07-24

## 2024-07-24 PROBLEM — F41.9 ANXIETY AND DEPRESSION: Status: RESOLVED | Noted: 2018-06-11 | Resolved: 2024-07-24

## 2024-07-24 PROBLEM — R25.3 MUSCLE TWITCHING: Status: RESOLVED | Noted: 2018-09-25 | Resolved: 2024-07-24

## 2024-07-24 LAB
ALBUMIN SERPL-MCNC: 5 G/DL (ref 3.2–4.8)
ALBUMIN/GLOB SERPL: 1.8 {RATIO} (ref 1–2)
ALP LIVER SERPL-CCNC: 47 U/L
ALT SERPL-CCNC: <7 U/L
ANION GAP SERPL CALC-SCNC: 8 MMOL/L (ref 0–18)
AST SERPL-CCNC: 17 U/L (ref ?–34)
BASOPHILS # BLD AUTO: 0.05 X10(3) UL (ref 0–0.2)
BASOPHILS NFR BLD AUTO: 0.8 %
BILIRUB SERPL-MCNC: 1.4 MG/DL (ref 0.3–1.2)
BUN BLD-MCNC: 13 MG/DL (ref 9–23)
CALCIUM BLD-MCNC: 9.9 MG/DL (ref 8.7–10.4)
CHLORIDE SERPL-SCNC: 105 MMOL/L (ref 98–112)
CHOLEST SERPL-MCNC: 211 MG/DL (ref ?–200)
CO2 SERPL-SCNC: 26 MMOL/L (ref 21–32)
CREAT BLD-MCNC: 0.74 MG/DL
EGFRCR SERPLBLD CKD-EPI 2021: 100 ML/MIN/1.73M2 (ref 60–?)
EOSINOPHIL # BLD AUTO: 0.08 X10(3) UL (ref 0–0.7)
EOSINOPHIL NFR BLD AUTO: 1.3 %
ERYTHROCYTE [DISTWIDTH] IN BLOOD BY AUTOMATED COUNT: 12.3 %
FASTING PATIENT LIPID ANSWER: NO
FASTING STATUS PATIENT QL REPORTED: NO
GLOBULIN PLAS-MCNC: 2.8 G/DL (ref 2.8–4.4)
GLUCOSE BLD-MCNC: 88 MG/DL (ref 70–99)
HCT VFR BLD AUTO: 43.2 %
HDLC SERPL-MCNC: 66 MG/DL (ref 40–59)
HGB BLD-MCNC: 14.6 G/DL
IMM GRANULOCYTES # BLD AUTO: 0.02 X10(3) UL (ref 0–1)
IMM GRANULOCYTES NFR BLD: 0.3 %
LDLC SERPL CALC-MCNC: 131 MG/DL (ref ?–100)
LYMPHOCYTES # BLD AUTO: 2.01 X10(3) UL (ref 1–4)
LYMPHOCYTES NFR BLD AUTO: 31.6 %
MCH RBC QN AUTO: 29.3 PG (ref 26–34)
MCHC RBC AUTO-ENTMCNC: 33.8 G/DL (ref 31–37)
MCV RBC AUTO: 86.7 FL
MONOCYTES # BLD AUTO: 0.5 X10(3) UL (ref 0.1–1)
MONOCYTES NFR BLD AUTO: 7.9 %
NEUTROPHILS # BLD AUTO: 3.7 X10 (3) UL (ref 1.5–7.7)
NEUTROPHILS # BLD AUTO: 3.7 X10(3) UL (ref 1.5–7.7)
NEUTROPHILS NFR BLD AUTO: 58.1 %
NONHDLC SERPL-MCNC: 145 MG/DL (ref ?–130)
OSMOLALITY SERPL CALC.SUM OF ELEC: 288 MOSM/KG (ref 275–295)
PLATELET # BLD AUTO: 330 10(3)UL (ref 150–450)
POTASSIUM SERPL-SCNC: 3.7 MMOL/L (ref 3.5–5.1)
PROT SERPL-MCNC: 7.8 G/DL (ref 5.7–8.2)
RBC # BLD AUTO: 4.98 X10(6)UL
SODIUM SERPL-SCNC: 139 MMOL/L (ref 136–145)
TRIGL SERPL-MCNC: 77 MG/DL (ref 30–149)
TSI SER-ACNC: 1.42 MIU/ML (ref 0.55–4.78)
VLDLC SERPL CALC-MCNC: 14 MG/DL (ref 0–30)
WBC # BLD AUTO: 6.4 X10(3) UL (ref 4–11)

## 2024-07-24 PROCEDURE — 85025 COMPLETE CBC W/AUTO DIFF WBC: CPT

## 2024-07-24 PROCEDURE — 86376 MICROSOMAL ANTIBODY EACH: CPT

## 2024-07-24 PROCEDURE — 80061 LIPID PANEL: CPT

## 2024-07-24 PROCEDURE — 82306 VITAMIN D 25 HYDROXY: CPT

## 2024-07-24 PROCEDURE — 82607 VITAMIN B-12: CPT

## 2024-07-24 PROCEDURE — 84443 ASSAY THYROID STIM HORMONE: CPT

## 2024-07-24 PROCEDURE — 36415 COLL VENOUS BLD VENIPUNCTURE: CPT

## 2024-07-24 PROCEDURE — 86800 THYROGLOBULIN ANTIBODY: CPT

## 2024-07-24 PROCEDURE — 80053 COMPREHEN METABOLIC PANEL: CPT

## 2024-07-24 PROCEDURE — 99396 PREV VISIT EST AGE 40-64: CPT | Performed by: PHYSICIAN ASSISTANT

## 2024-07-24 RX ORDER — ESTRADIOL AND NORETHINDRONE ACETATE 1; .5 MG/1; MG/1
1 TABLET ORAL DAILY
Qty: 90 TABLET | Refills: 3 | Status: SHIPPED | OUTPATIENT
Start: 2024-07-24

## 2024-07-24 NOTE — PROGRESS NOTES
Subjective:   Patient ID: Kiah Spring is a 49 year old female.    HPI  Patient presents today requesting physical exam.      Diet: well balanced  Exercise: staying active  Tobacco use: denies  Drug use: denies  Alcohol use: denies  LMP: perimenopause, sweating all the time, history of mood changes(depression better now), unpredictable periods, heavy bleeding, brain fog that affects her work, weight gain in the midsection  Marital status:   She has 5 children     Health maintenance:  Pap/Hpv: 11/23/2015 negative  Mammogram: 2/3/2018 negative  Performs SBEs: yes  Dexa scan: never  Colonoscopy: never  Discussed age appropriate vaccines     H/o goiter - ultrasound last done 3/2021    H/o hashimotos thyroiditis  Has been off meds for quite some time  Due for labs      History/Other:   Review of Systems   Constitutional:  Negative for chills, fatigue and fever.   HENT:  Negative for congestion, ear pain, rhinorrhea and sore throat.    Eyes:  Negative for visual disturbance.   Respiratory:  Negative for cough, shortness of breath and wheezing.    Cardiovascular:  Negative for chest pain, palpitations and leg swelling.   Gastrointestinal:  Negative for abdominal pain, diarrhea, nausea and vomiting.   Genitourinary:  Positive for menstrual problem (menopause symptoms (hot flahses, period changes, brain fog, weight gain)). Negative for dysuria, frequency and hematuria.   Musculoskeletal:  Negative for arthralgias, gait problem and myalgias.   Skin:  Negative for rash.   Neurological:  Negative for weakness, light-headedness and headaches.   Hematological:  Negative for adenopathy.   Psychiatric/Behavioral:  Negative for dysphoric mood. The patient is not nervous/anxious.      No current outpatient medications on file.     Allergies:  Allergies   Allergen Reactions    Triptans HIVES       Objective:   Physical Exam  Vitals and nursing note reviewed.   Constitutional:       General: She is not in acute distress.      Appearance: Normal appearance. She is well-developed.   HENT:      Head: Normocephalic and atraumatic.      Right Ear: Tympanic membrane, ear canal and external ear normal.      Left Ear: Tympanic membrane, ear canal and external ear normal.      Nose: Nose normal.      Mouth/Throat:      Mouth: Mucous membranes are moist.   Eyes:      Extraocular Movements: Extraocular movements intact.      Conjunctiva/sclera: Conjunctivae normal.      Pupils: Pupils are equal, round, and reactive to light.   Neck:      Thyroid: Thyromegaly (right side more so than left) present.   Cardiovascular:      Rate and Rhythm: Normal rate and regular rhythm.      Pulses: Normal pulses.      Heart sounds: Normal heart sounds.   Pulmonary:      Effort: Pulmonary effort is normal.      Breath sounds: Normal breath sounds. No wheezing or rales.   Abdominal:      General: Bowel sounds are normal. There is no distension.      Palpations: Abdomen is soft. There is no mass.      Tenderness: There is no abdominal tenderness.   Musculoskeletal:         General: No tenderness. Normal range of motion.      Cervical back: Normal range of motion and neck supple.   Lymphadenopathy:      Cervical: No cervical adenopathy.   Skin:     General: Skin is warm and dry.      Findings: No rash.   Neurological:      General: No focal deficit present.      Mental Status: She is alert and oriented to person, place, and time.   Psychiatric:         Mood and Affect: Mood normal.         Behavior: Behavior normal.         Assessment & Plan:   1. Routine general medical examination at a health care facility  Patient is generally healthy.  Physical exam is unremarkable.  Check fasting labs.  Health maintenance issues discussed. Encouraged routine vaccines.  Advised healthy diet and regular exercise.  Annual physicals.  - CBC With Differential With Platelet; Future  - Comp Metabolic Panel (14); Future  - Lipid Panel; Future  - TSH W Reflex To Free T4; Future  - Vitamin  B12; Future  - Vitamin D; Future  - Thyroid peroxidase & thyroglobulin ab; Future    2. Perimenopause  Lengthy discussion regarding options for treating symptoms. Will start trial of HRT. Follow up in 4 weeks, if not effective, consider increasing dose.   - Estradiol-Norethindrone Acet (MIMVEY) 1-0.5 MG Oral Tab; Take 1 tablet by mouth daily.  Dispense: 90 tablet; Refill: 3    3. Visit for screening mammogram  - [facility] 2D+3D SCREENING BILAT (CPT=77067/04270); Future    4. Multiple thyroid nodules  Repeat ultrasound and follow up pending results. Refer back to ENT if needed.   - US THYROID (CPT=76536); Future    5. Colon cancer screening  - COLOGUARD COLON CANCER SCREENING (EXTERNAL)    6. Hashimoto's thyroiditis  Recheck labs and rx synthroid if needed.   - US THYROID (CPT=76536); Future  - TSH W Reflex To Free T4; Future  - Thyroid peroxidase & thyroglobulin ab; Future    7. Vitamin D deficiency  Recheck and supplement if needed.   - Vitamin D; Future

## 2024-07-25 LAB
THYROGLOB SERPL-MCNC: 36 U/ML (ref ?–60)
THYROPEROXIDASE AB SERPL-ACNC: 378 U/ML (ref ?–60)
VIT B12 SERPL-MCNC: 638 PG/ML (ref 211–911)
VIT D+METAB SERPL-MCNC: 39 NG/ML (ref 30–100)

## 2024-07-29 LAB — AMB EXT COLOGUARD RESULT: NEGATIVE

## 2024-08-07 ENCOUNTER — TELEPHONE (OUTPATIENT)
Dept: FAMILY MEDICINE CLINIC | Facility: CLINIC | Age: 49
End: 2024-08-07

## 2024-10-07 ENCOUNTER — TELEMEDICINE (OUTPATIENT)
Dept: FAMILY MEDICINE CLINIC | Facility: CLINIC | Age: 49
End: 2024-10-07
Payer: COMMERCIAL

## 2024-10-07 DIAGNOSIS — R41.840 POOR CONCENTRATION: Primary | ICD-10-CM

## 2024-10-07 RX ORDER — ATOMOXETINE 40 MG/1
40 CAPSULE ORAL DAILY
Qty: 30 CAPSULE | Refills: 0 | Status: SHIPPED | OUTPATIENT
Start: 2024-10-07

## 2024-10-07 NOTE — PROGRESS NOTES
Video Visit    Kiah Spring is a 49 year old female.  No chief complaint on file.      HPI:   Patient presents to follow up from last visit  She could not tolerate the HRT  She actually felt worse with more migraines, prolonged bleeding, not sleeping  She stopped medication and is feeling better but still experiencing some perimenopausal symptoms  The most bothersome symptoms she has now are related to focus/attention  She is struggling to organize her tasks/thoughts  She has a lot of symptoms of inattention and this is negatively impacting her job  In the past she used stimulants which were not well tolerated  Atomoxetine seemed to work the best and wonders about restarting it      Current Outpatient Medications   Medication Sig Dispense Refill    Estradiol-Norethindrone Acet (MIMVEY) 1-0.5 MG Oral Tab Take 1 tablet by mouth daily. 90 tablet 3      Past Medical History:    Hashimoto's disease    Lyme disease    BORDERLINE      No past surgical history on file.   Social History:  Social History     Socioeconomic History    Marital status:    Tobacco Use    Smoking status: Never    Smokeless tobacco: Never   Vaping Use    Vaping status: Never Used   Substance and Sexual Activity    Alcohol use: No     Alcohol/week: 0.0 standard drinks of alcohol    Drug use: No   Other Topics Concern    Caffeine Concern Yes     Comment: 1-2 cup daily    Exercise Yes     Comment: 5 times per week        REVIEW OF SYSTEMS:   GENERAL HEALTH: Denies fevers, chills, sweats, and fatigue.  EYES: Denies vision changes, eye redness, itching, or drainage.   HENT: Denies hearing loss, ear pain, nasal congestion, sinus pain, and sore throat.  SKIN: Denies skin lesions and rashes.  RESPIRATORY: Denies shortness of breath, wheezing, and cough.  CARDIOVASCULAR: Denies chest pain, palpitations, and edema.  GI: Denies abdominal pain, heartburn, nausea, vomiting, and diarrhea.  : Denies dysuria, hematuria, urinary frequency, change  urine color, and discharge.  MUSK: Denies back pain, joint pain, neck pain, and myalgias.  NEURO: Denies numbness/tingling, weakness, and headaches  ENDO: Denies polyuria, polydipsia, and tremors.  ALLERGY/ASTHMA: Denies asthma and environmental allergies  HEME: Denies anemia, abnormal bleeding, and easy bruising.  PSYCH: decreased focus/attention      EXAM:   GENERAL: well developed, well nourished, NAD.  HEENT: AT/NC. Normal lips, gums, teeth, tongue.   LUNGS: Speaking in complete sentences comfortably without increased work of breathing.  SKIN: normal mobility and turgor. No rashes or suspicious lesions.   PSYCH: Normal mood and affect, A&Ox3.      ASSESSMENT AND PLAN:   1. Poor concentration  Restart medication and follow up in 2-4 weeks with condition update. Sooner prn.  - atomoxetine 40 MG Oral Cap; Take 1 capsule (40 mg total) by mouth daily.  Dispense: 30 capsule; Refill: 0        The patient indicates understanding of these issues and agrees to the plan.    No follow-ups on file.      Kyle Poe PA-C  10/7/2024  2:58 PM    Telehealth Verbal Consent   I conducted a telehealth visit with Kiah Spring today, 10/07/24, which was completed using two-way, real-time interactive audio and video communication. This has been done in good alex to provide continuity of care in the best interest of the provider-patient relationship, due to the COVID -19 public health crisis/national emergency where restrictions of face-to-face office visits are ongoing. Every conscious effort was taken to allow for sufficient and adequate time to complete the visit.  The patient was made aware of the limitations of the telehealth visit, including treatment limitations as no physical exam could be performed.  The patient was advised to call 911 or to go to the ER in case there was an emergency.  The patient was also advised of the potential privacy & security concerns related to the telehealth platform.   The patient was made  aware of where to find [facility]'s notice of privacy practices, telehealth consent form and other related consent forms and documents.  which are located on the [facility] website. The patient verbally agreed to telehealth consent form, related consents and the risks discussed.    Lastly, the patient confirmed that they were in [place].   Included in this visit, time may have been spent reviewing labs, medications, radiology tests and decision making. Appropriate medical decision-making and tests are ordered as detailed in the plan of care above.  Coding/billing information is submitted for this visit based on complexity of care and/or time spent for the visit.

## 2024-11-04 DIAGNOSIS — R41.840 POOR CONCENTRATION: ICD-10-CM

## 2024-11-04 RX ORDER — ATOMOXETINE 40 MG/1
40 CAPSULE ORAL DAILY
Qty: 30 CAPSULE | Refills: 0 | Status: CANCELLED | OUTPATIENT
Start: 2024-11-04

## 2024-11-04 NOTE — TELEPHONE ENCOUNTER
.A refill request was received for:  Requested Prescriptions     Pending Prescriptions Disp Refills    atomoxetine 40 MG Oral Cap 30 capsule 0     Sig: Take 1 capsule (40 mg total) by mouth daily.       Last refill date:   10/7/24    Last office visit: 10/7/24    Follow up due:  No future appointments.      Patient comment: I was asked to reach out after a month if this is working for me. I am seeing some improvements and would like to stay on this med. I dont know if we can bunp up the dose a vit or if angelika redman may come over time.

## 2024-11-05 RX ORDER — ATOMOXETINE 60 MG/1
60 CAPSULE ORAL
Qty: 30 CAPSULE | Refills: 0 | Status: SHIPPED | OUTPATIENT
Start: 2024-11-05 | End: 2024-12-17

## 2024-12-14 ENCOUNTER — PATIENT MESSAGE (OUTPATIENT)
Dept: FAMILY MEDICINE CLINIC | Facility: CLINIC | Age: 49
End: 2024-12-14

## 2024-12-15 DIAGNOSIS — R41.840 POOR CONCENTRATION: ICD-10-CM

## 2024-12-16 RX ORDER — ATOMOXETINE 60 MG/1
CAPSULE ORAL
COMMUNITY
Start: 2024-11-14 | End: 2024-12-17

## 2024-12-16 NOTE — TELEPHONE ENCOUNTER
A refill request was received for:  Requested Prescriptions     Pending Prescriptions Disp Refills    ATOMOXETINE 60 MG Oral Cap [Pharmacy Med Name: Atomoxetine HCl 60 MG Oral Capsule] 30 capsule 0     Sig: TAKE 1 CAPSULE BY MOUTH ONCE DAILY WITH SUPPER       Last refill date:  11/14/24     Last office visit: 07/24/24.      No future appointments.

## 2024-12-17 RX ORDER — ATOMOXETINE 60 MG/1
60 CAPSULE ORAL
Qty: 30 CAPSULE | Refills: 2 | Status: SHIPPED | OUTPATIENT
Start: 2024-12-17

## 2025-02-13 ENCOUNTER — TELEMEDICINE (OUTPATIENT)
Dept: FAMILY MEDICINE CLINIC | Facility: CLINIC | Age: 50
End: 2025-02-13
Payer: COMMERCIAL

## 2025-02-13 DIAGNOSIS — F33.8 SEASONAL DEPRESSION: ICD-10-CM

## 2025-02-13 DIAGNOSIS — R41.840 POOR CONCENTRATION: Primary | ICD-10-CM

## 2025-02-13 PROCEDURE — 98006 SYNCH AUDIO-VIDEO EST MOD 30: CPT | Performed by: PHYSICIAN ASSISTANT

## 2025-02-13 RX ORDER — BUPROPION HYDROCHLORIDE 150 MG/1
150 TABLET ORAL DAILY
Qty: 60 TABLET | Refills: 5 | Status: SHIPPED | OUTPATIENT
Start: 2025-02-13

## 2025-02-13 RX ORDER — ATOMOXETINE 60 MG/1
60 CAPSULE ORAL
Qty: 30 CAPSULE | Refills: 5 | Status: SHIPPED | OUTPATIENT
Start: 2025-02-13

## 2025-02-13 NOTE — PROGRESS NOTES
Video Visit    Kiah Spring is a 49 year old female.  No chief complaint on file.      HPI:   Patient presents for follow up of mental health.  States her depression is getting worse again  Worse in winter time usually  Feels like she cannot leave her house  Has done well with bupropion in the past and would like to restart  Not motivated to keep up with work    Her focus/attention are improved with Strattera and would like to continue same dose for now      Current Outpatient Medications   Medication Sig Dispense Refill    buPROPion  MG Oral Tablet 24 Hr Take 1 tablet (150 mg total) by mouth daily. 60 tablet 5    atomoxetine 60 MG Oral Cap Take 1 capsule (60 mg total) by mouth daily with dinner. 30 capsule 5      Past Medical History:    Hashimoto's disease    Lyme disease    BORDERLINE      No past surgical history on file.   Social History:  Social History     Socioeconomic History    Marital status:    Tobacco Use    Smoking status: Never    Smokeless tobacco: Never   Vaping Use    Vaping status: Never Used   Substance and Sexual Activity    Alcohol use: No     Alcohol/week: 0.0 standard drinks of alcohol    Drug use: No   Other Topics Concern    Caffeine Concern Yes     Comment: 1-2 cup daily    Exercise Yes     Comment: 5 times per week        REVIEW OF SYSTEMS:   GENERAL HEALTH: Denies fevers, chills, sweats, and fatigue.  EYES: Denies vision changes, eye redness, itching, or drainage.   HENT: Denies hearing loss, ear pain, nasal congestion, sinus pain, and sore throat.  SKIN: Denies skin lesions and rashes.  RESPIRATORY: Denies shortness of breath, wheezing, and cough.  CARDIOVASCULAR: Denies chest pain, palpitations, and edema.  GI: Denies abdominal pain, heartburn, nausea, vomiting, and diarrhea.  : Denies dysuria, hematuria, urinary frequency, change urine color, and discharge.  MUSK: Denies back pain, joint pain, neck pain, and myalgias.  NEURO: Denies numbness/tingling, weakness,  and headaches  ENDO: Denies polyuria, polydipsia, and tremors.  ALLERGY/ASTHMA: Denies asthma and environmental allergies  HEME: Denies anemia, abnormal bleeding, and easy bruising.  PSYCH: + depression.      EXAM:   GENERAL: well developed, well nourished, NAD.  HEENT: AT/NC. Normal lips, gums, teeth, tongue.   LUNGS: Speaking in complete sentences comfortably without increased work of breathing.  SKIN: normal mobility and turgor. No rashes or suspicious lesions.   PSYCH: Normal mood and affect, A&Ox3.      ASSESSMENT AND PLAN:   1. Poor concentration  Better controlled. Cpm. Refills given. Follow up in 6 months. Sooner prn.   - atomoxetine 60 MG Oral Cap; Take 1 capsule (60 mg total) by mouth daily with dinner.  Dispense: 30 capsule; Refill: 5    2. Seasonal depression  Uncontrolled. Restart Wellbutrin at previous dose. Follow up in 3-4 weeks if not improving. Therwise we can check in after 3-6 months.   - buPROPion  MG Oral Tablet 24 Hr; Take 1 tablet (150 mg total) by mouth daily.  Dispense: 60 tablet; Refill: 5        The patient indicates understanding of these issues and agrees to the plan.    No follow-ups on file.      Kyle Poe PA-C  2/13/2025  12:30 PM    Telehealth Verbal Consent   I conducted a telehealth visit with Kiah Spring today, 02/13/25, which was completed using two-way, real-time interactive audio and video communication. This has been done in good alex to provide continuity of care in the best interest of the provider-patient relationship, due to the COVID -19 public health crisis/national emergency where restrictions of face-to-face office visits are ongoing. Every conscious effort was taken to allow for sufficient and adequate time to complete the visit.  The patient was made aware of the limitations of the telehealth visit, including treatment limitations as no physical exam could be performed.  The patient was advised to call 911 or to go to the ER in case there was an  emergency.  The patient was also advised of the potential privacy & security concerns related to the telehealth platform.   The patient was made aware of where to find [facility]'s notice of privacy practices, telehealth consent form and other related consent forms and documents.  which are located on the [facility] website. The patient verbally agreed to telehealth consent form, related consents and the risks discussed.    Lastly, the patient confirmed that they were in [place].   Included in this visit, time may have been spent reviewing labs, medications, radiology tests and decision making. Appropriate medical decision-making and tests are ordered as detailed in the plan of care above.  Coding/billing information is submitted for this visit based on complexity of care and/or time spent for the visit.

## 2025-02-18 ENCOUNTER — HOSPITAL ENCOUNTER (OUTPATIENT)
Dept: MAMMOGRAPHY | Age: 50
Discharge: HOME | End: 2025-02-18
Attending: PHYSICIAN ASSISTANT
Payer: COMMERCIAL

## 2025-02-18 DIAGNOSIS — Z12.31 VISIT FOR SCREENING MAMMOGRAM: ICD-10-CM

## 2025-02-18 PROCEDURE — 77067 SCR MAMMO BI INCL CAD: CPT | Performed by: PHYSICIAN ASSISTANT

## 2025-02-18 PROCEDURE — 77063 BREAST TOMOSYNTHESIS BI: CPT | Performed by: PHYSICIAN ASSISTANT

## (undated) DIAGNOSIS — F41.9 ANXIETY: ICD-10-CM

## (undated) NOTE — ED AVS SNAPSHOT
Ms. Kate Damaris   MRN: [id number]    Department:  [facility] Emergency Department   Date of Visit:  5/23/2018           Disclosure     Insurance plans vary and the physician(s) referred by the ER may not be covered by your plan.  Please conta tell this physician (or your personal doctor if your instructions are to return to your personal doctor) about any new or lasting problems. The primary care or specialist physician will see patients referred from the [facility] Emergency Department.  Wing Evans

## (undated) NOTE — ED AVS SNAPSHOT
Ms. Gus Enrique   MRN: [id number]    Department:  [facility] Emergency Department   Date of Visit:  9/25/2018           Disclosure     Insurance plans vary and the physician(s) referred by the ER may not be covered by your plan.  Please conta tell this physician (or your personal doctor if your instructions are to return to your personal doctor) about any new or lasting problems. The primary care or specialist physician will see patients referred from the [facility] Emergency Department.  Mateo Shipley

## (undated) NOTE — MR AVS SNAPSHOT
[address]  [address] [facility], [address] [id number] [phone]               Thank you for choosing us for your health care visit with [place], [place].   We are glad to serve you and happy to provide you with this view more details from this visit by going to [email or web address]. If you've recently had a stay at the Hospital you can access your discharge instructions in [facility] by going to Visits < Admission Summaries.  If you've been to the Emergency Depar

## (undated) NOTE — MR AVS SNAPSHOT
[address]  [address] [id number] [phone]               Thank you for choosing us for your health care visit with Isabel Tsai.   We are glad to serve you and happy to provide you with this [facility] questions? Call [phone] for help. [facility] is NOT to be used for urgent needs. For medical emergencies, dial 911.            Visit [facility] online at  [email or web address]

## (undated) NOTE — LETTER
03/05/18        [address] [place] [id number]      Dear Reshma Barron,    [facility] records indicate that you have outstanding lab work and or testing that was ordered for you and has not yet been completed:          T4 FREE [866]      TSH